# Patient Record
Sex: FEMALE | Race: WHITE
[De-identification: names, ages, dates, MRNs, and addresses within clinical notes are randomized per-mention and may not be internally consistent; named-entity substitution may affect disease eponyms.]

---

## 2019-04-09 ENCOUNTER — HOSPITAL ENCOUNTER (INPATIENT)
Dept: HOSPITAL 25 - ED | Age: 44
LOS: 7 days | Discharge: HOME | DRG: 751 | End: 2019-04-16
Attending: PSYCHIATRY & NEUROLOGY | Admitting: PSYCHIATRY & NEUROLOGY
Payer: MEDICAID

## 2019-04-09 DIAGNOSIS — J45.909: ICD-10-CM
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
## 2019-04-09 DIAGNOSIS — Z81.1: ICD-10-CM

## 2019-04-09 DIAGNOSIS — K76.0: ICD-10-CM

## 2019-04-09 DIAGNOSIS — Z91.048: ICD-10-CM

## 2019-04-09 DIAGNOSIS — I10: ICD-10-CM

## 2019-04-09 DIAGNOSIS — F33.2: Primary | ICD-10-CM

## 2019-04-09 DIAGNOSIS — Z88.8: ICD-10-CM

## 2019-04-09 DIAGNOSIS — F19.94: ICD-10-CM

## 2019-04-09 DIAGNOSIS — F10.229: ICD-10-CM

## 2019-04-09 DIAGNOSIS — Z56.0: ICD-10-CM

## 2019-04-09 DIAGNOSIS — Z91.410: ICD-10-CM

## 2019-04-09 DIAGNOSIS — F43.10: ICD-10-CM

## 2019-04-09 DIAGNOSIS — F41.0: ICD-10-CM

## 2019-04-09 DIAGNOSIS — F17.210: ICD-10-CM

## 2019-04-09 LAB
ALBUMIN SERPL BCG-MCNC: 4.1 G/DL (ref 3.2–5.2)
ALBUMIN/GLOB SERPL: 1.2 {RATIO} (ref 1–3)
ALP SERPL-CCNC: 85 U/L (ref 34–104)
ALT SERPL W P-5'-P-CCNC: 17 U/L (ref 7–52)
ANION GAP SERPL CALC-SCNC: 13 MMOL/L (ref 2–11)
APAP SERPL-MCNC: < 15 MCG/ML
AST SERPL-CCNC: 22 U/L (ref 13–39)
BASOPHILS # BLD AUTO: 0.1 10^3/UL (ref 0–0.2)
BUN SERPL-MCNC: 10 MG/DL (ref 6–24)
BUN/CREAT SERPL: 24.4 (ref 8–20)
CALCIUM SERPL-MCNC: 9.2 MG/DL (ref 8.6–10.3)
CHLORIDE SERPL-SCNC: 105 MMOL/L (ref 101–111)
EOSINOPHIL # BLD AUTO: 0 10^3/UL (ref 0–0.6)
GLOBULIN SER CALC-MCNC: 3.4 G/DL (ref 2–4)
GLUCOSE SERPL-MCNC: 90 MG/DL (ref 70–100)
HCG SERPL QL: 1.25 MIU/ML
HCO3 SERPL-SCNC: 25 MMOL/L (ref 22–32)
HCT VFR BLD AUTO: 38 % (ref 33–41)
HGB BLD-MCNC: 12.6 G/DL (ref 12–16)
LYMPHOCYTES # BLD AUTO: 3.2 10^3/UL (ref 1–4.8)
MCH RBC QN AUTO: 30 PG (ref 27–31)
MCHC RBC AUTO-ENTMCNC: 33 G/DL (ref 31–36)
MCV RBC AUTO: 90 FL (ref 80–97)
MONOCYTES # BLD AUTO: 0.3 10^3/UL (ref 0–0.8)
NEUTROPHILS # BLD AUTO: 1.4 10^3/UL (ref 1.5–7.7)
NRBC # BLD AUTO: 0 10^3/UL
NRBC BLD QL AUTO: 0
PLATELET # BLD AUTO: 264 10^3/UL (ref 150–450)
POTASSIUM SERPL-SCNC: 3.6 MMOL/L (ref 3.5–5)
PROT SERPL-MCNC: 7.5 G/DL (ref 6.4–8.9)
RBC # BLD AUTO: 4.25 10^6 /UL (ref 3.7–4.87)
SALICYLATES SERPL-MCNC: < 2.5 MG/DL (ref ?–30)
SODIUM SERPL-SCNC: 143 MMOL/L (ref 135–145)
TSH SERPL-ACNC: 1.13 MCIU/ML (ref 0.34–5.6)
WBC # BLD AUTO: 5 10^3/UL (ref 3.5–10.8)

## 2019-04-09 PROCEDURE — 81003 URINALYSIS AUTO W/O SCOPE: CPT

## 2019-04-09 PROCEDURE — 80329 ANALGESICS NON-OPIOID 1 OR 2: CPT

## 2019-04-09 PROCEDURE — 99222 1ST HOSP IP/OBS MODERATE 55: CPT

## 2019-04-09 PROCEDURE — 99238 HOSP IP/OBS DSCHRG MGMT 30/<: CPT

## 2019-04-09 PROCEDURE — 36415 COLL VENOUS BLD VENIPUNCTURE: CPT

## 2019-04-09 PROCEDURE — 99231 SBSQ HOSP IP/OBS SF/LOW 25: CPT

## 2019-04-09 PROCEDURE — 85025 COMPLETE CBC W/AUTO DIFF WBC: CPT

## 2019-04-09 PROCEDURE — 99232 SBSQ HOSP IP/OBS MODERATE 35: CPT

## 2019-04-09 PROCEDURE — 84443 ASSAY THYROID STIM HORMONE: CPT

## 2019-04-09 PROCEDURE — 80053 COMPREHEN METABOLIC PANEL: CPT

## 2019-04-09 PROCEDURE — 84702 CHORIONIC GONADOTROPIN TEST: CPT

## 2019-04-09 PROCEDURE — 80061 LIPID PANEL: CPT

## 2019-04-09 PROCEDURE — 99284 EMERGENCY DEPT VISIT MOD MDM: CPT

## 2019-04-09 PROCEDURE — G0480 DRUG TEST DEF 1-7 CLASSES: HCPCS

## 2019-04-09 PROCEDURE — 80307 DRUG TEST PRSMV CHEM ANLYZR: CPT

## 2019-04-09 PROCEDURE — 83036 HEMOGLOBIN GLYCOSYLATED A1C: CPT

## 2019-04-09 PROCEDURE — 80320 DRUG SCREEN QUANTALCOHOLS: CPT

## 2019-04-09 SDOH — ECONOMIC STABILITY - INCOME SECURITY: UNEMPLOYMENT, UNSPECIFIED: Z56.0

## 2019-04-09 NOTE — ED
Psychiatric Complaint





- HPI Summary


HPI Summary: 





A 44 y/o F brought in by ambulance presents to ED for MHE. Patient goes to 

[facility]. She is not medication compliant with her Cymbalta

, Seroquel, Hydroxyzine nor Klonopin. At bedside, she says I need to go to 

mental health. Shes been there before "a long time ago." She says "I'm scared.

" She admits to drinking today. Pt becomes easily tearful at bedside. Denies 

previous SI attempt. 











- History Of Current Complaint


Time Seen by Provider: 19 19:39


Hx Obtained From: Patient


Onset/Duration: Still Present


Timing: Constant


Severity Initially: Moderate


Severity Currently: Moderate


Character: Fearful


Aggravating Factor(s): Medication Non-compliance


Related History: Positive For: Prior Psychiatric Issues


Has Suicidal: Denies: Has Prior Attempt(s)





- Allergies/Home Medications


Allergies/Adverse Reactions: 


 Allergies











Allergy/AdvReac Type Severity Reaction Status Date / Time


 


Adhesive Tape [Paper Tape] Allergy  Itching Verified 19 10:43


 


pregabalin [From Lyrica] Allergy  Unknown Verified 19 10:43





   Reaction  





   Details  














PMH/Surg Hx/FS Hx/Imm Hx


Previously Healthy: No


Endocrine/Hematology History: 


   Denies: Hx Diabetes


Cardiovascular History: Reports: Hx Hypertension


   Denies: Hx Pacemaker/ICD


Respiratory History: Reports: Hx Asthma


GI History: Reports: Other GI Disorders - hx of fatty liver disease


Musculoskeletal History: Reports: Hx Back Problems, Other Musculoskeletal 

History - hx of degenerative disc disease


Sensory History: Reports: Hx Contacts or Glasses - pt states "supposed to wear 

glasses but don't use them"


   Denies: Hx Hearing Aid


Opthamlomology History: Reports: Hx Contacts or Glasses - pt states "supposed 

to wear glasses but don't use them"


Neurological History: Reports: Hx Seizures - d/t etoh withdrawal 7 years ago


Psychiatric History: Reports: Hx Anxiety, Hx Depression, Hx Panic Disorder - 

ANXIETY, Hx Inpatient Treatment - following partner's suicide in , 

Hx Substance Abuse


   Denies: Hx Eating Disorder, Hx of Violent Episodes Against Others





- Surgical History


Surgery Procedure, Year, and Place:  & [facility] SURGERY X2.  





- Immunization History


Date of Tetanus Vaccine: utd


Date of Influenza Vaccine: none


Infectious Disease History: 


   Denies: Hx of Known/Suspected MRSA





- Family History


Known Family History: Positive: Other


   Negative: Diabetes


Family History: alcoholism, SI





- Social History


Occupation: Unemployed


Lives: With Family


Alcohol Use: Daily


Alcohol Amount: 1/5 a week


Hx Substance Use: No


Substance Use Type: Reports: Marijuana


Substance Use Comment - Amount & Last Used: occasionally


Hx Tobacco Use: Yes


Smoking Status (MU): Heavy Every Day Tobacco Smoker


Type: Cigarettes


Amount Used/How Often: states she smokes 1 PPD


Have You Smoked in the Last Year: Yes





Review of Systems


Positive: Other - pos: intoxicated.  Negative: Fever


Psychological: Other - pos: scared


All Other Systems Reviewed And Are Negative: Yes





Physical Exam





- Summary


Physical Exam Summary: 





Appearance: The patient is well-nourished in no acute distress and in no acute 

pain. Patient's breath smells of ETOH, patient slurs her speech. 





Skin: The skin is warm and dry and skin color reflects adequate perfusion.





HEENT:  The head is normocephalic and atraumatic. The pupils are equal and 

reactive. The conjunctivae are clear and without drainage.  Nares are patent 

and without drainage.  Mouth reveals moist mucous membranes and the throat is 

without erythema and exudate.  The external ears are intact. The ear canals are 

patent and without drainage. The tympanic membranes are intact.





Neck: the neck is supple with full range of motion and non-tender. There are no 

carotid bruits.  There is no neck vein distension.





Respiratory: Chest is non-tender.  Lungs are clear to auscultation and breath 

sounds are symmetrical and equal.





Cardiovascular: Heart is regular rate and rhythm.  There is no murmur or rub 

auscultated.   There is no peripheral edema and pulses are symmetrical and 

equal.





Abdomen: The abdomen is soft and non-tender.  There are normal bowel sounds 

heard in all four quadrants and there is no organomegaly palpated.





Musculoskeletal: There is no back tenderness noted.  Extremities are non-tender 

with full range of motion.  There is good capillary refill.  There is no 

peripheral edema or calf tenderness elicited.





Neurological: Patient is alert and oriented to person, place and time.  The 

patient has symmetrical motor strength in all four extremities.  Cranial nerves 

are grossly intact. Deep tendon reflexes are symmetrical and equal in all four 

extremities.





Psychiatric: The patient is emotionally labile. 








Triage Information Reviewed: Yes


Vital Signs Reviewed: Yes





Diagnostics





- Laboratory


Result Diagrams: 


 19 20:21





 19 20:21


Lab Statement: Any lab studies that have been ordered have been reviewed, and 

results considered in the medical decision making process.





Course/Dx





- Course


Course Of Treatment: Ms. Herbert presented tearful and asking to be admitted to 

the mental health unit.  She was found to be quite intoxicated and at this 

point is sleeping and stable.  She will be reevaluated in the morning.





- Differential Dx/Clinical Impression


Provider Diagnosis: 


 Alcohol intoxication








Discharge





- Sign-Out/Discharge


Documenting (check all that apply): Sign-Out Patient


Signing out patient TO: Timothy Durham - pending sobriety/MHE


Patient Received Moderate/Deep Sedation with Procedure: No





- Discharge Plan


Referrals: 


No Primary Care Phys,NOPCP [Primary Care Provider] - 





- Attestation Statements


Document Initiated by Meñoibjaclyn: Yes


Documenting Scribe: Breana Lugo


Provider For Whom Scribe is Documenting (Include Credential): Dr. Lee Haile MD


Scribe Attestation: 


NICO, lisa Mahered for Dr. Lee Haile MD on 19 at 2151. 


Scribe Documentation Reviewed: Yes


Provider Attestation: 


The documentation as recorded by the Breana jolly accurately 

reflects the service I personally performed and the decisions made by me, Dr. Lee Haile MD


Status of Scribe Document: Viewed

## 2019-04-10 RX ADMIN — NICOTINE PRN MG: 4 INHALANT RESPIRATORY (INHALATION) at 10:59

## 2019-04-10 RX ADMIN — NICOTINE PRN MG: 4 INHALANT RESPIRATORY (INHALATION) at 19:53

## 2019-04-10 RX ADMIN — QUETIAPINE FUMARATE SCH MG: 300 TABLET, FILM COATED, EXTENDED RELEASE ORAL at 21:01

## 2019-04-10 RX ADMIN — DULOXETINE HYDROCHLORIDE SCH MG: 60 CAPSULE, DELAYED RELEASE ORAL at 21:00

## 2019-04-10 RX ADMIN — GABAPENTIN SCH MG: 400 CAPSULE ORAL at 21:00

## 2019-04-10 RX ADMIN — GABAPENTIN SCH MG: 400 CAPSULE ORAL at 14:20

## 2019-04-10 RX ADMIN — NICOTINE POLACRILEX PRN MG: 2 GUM, CHEWING BUCCAL at 10:59

## 2019-04-10 RX ADMIN — LIDOCAINE PRN PATCH: 50 PATCH CUTANEOUS at 18:00

## 2019-04-10 RX ADMIN — LORAZEPAM SCH MG: 1 TABLET ORAL at 14:20

## 2019-04-10 RX ADMIN — LORAZEPAM SCH MG: 1 TABLET ORAL at 10:47

## 2019-04-10 RX ADMIN — TRAZODONE HYDROCHLORIDE SCH MG: 100 TABLET ORAL at 21:01

## 2019-04-10 RX ADMIN — NICOTINE SCH PATCH: 21 PATCH TRANSDERMAL at 14:21

## 2019-04-10 RX ADMIN — DULOXETINE HYDROCHLORIDE SCH MG: 60 CAPSULE, DELAYED RELEASE ORAL at 14:20

## 2019-04-10 RX ADMIN — ACETAMINOPHEN PRN MG: 325 TABLET ORAL at 19:51

## 2019-04-10 RX ADMIN — NICOTINE POLACRILEX PRN MG: 2 GUM, CHEWING BUCCAL at 19:54

## 2019-04-10 RX ADMIN — WATER SCH: 100 INJECTION, SOLUTION INTRAVENOUS at 21:35

## 2019-04-10 RX ADMIN — LORAZEPAM SCH MG: 1 TABLET ORAL at 18:19

## 2019-04-10 NOTE — ED
Progress





- Progress Note


Progress Note: 


This patient was signed out from Dr. Durham to Dr. Haro upon shift change, 

pending sobriety and MHE. 





Re-Evaluation





- Re-Evaluation


  ** 1st re-eval


Re-Evaluation Time: 09:07


Change: Unchanged


Comment: Per Dr. Simeon, the pt will be [facility] with dx of alcohol 

intoxication and substance induced mood disorder.





Course/Dx





- Course


Course Of Treatment: This patient was signed out from Dr. Durham to Dr. Haro 

upon shift change, pending sobriety and MHE. As of 0907, per Dr. Simeon, the pt 

will be admitted to [facility] with dx of alcohol intoxication and substance induced 

mood disorder. The pt is agreeable with this plan.





- Diagnoses


Provider Diagnoses: 


 Alcohol intoxication, Substance induced mood disorder








Discharge





- Sign-Out/Discharge


Documenting (check all that apply): Patient Departure, Receiving Sign-Out


Receiving patient FROM: Timothy Durham


Patient Received Moderate/Deep Sedation with Procedure: No





- Discharge Plan


Condition: Stable


Disposition: PSYCHIATRIC FACILITY[facility]





- Billing Disposition and Condition


Condition: STABLE


Disposition: Psychiatric Facility [facility]





- Attestation Statements


Document Initiated by Scribe: Yes


Documenting Scribe: Faith Brown


Provider For Whom Meñoibe is Documenting (Include Credential): Lee Haro MD.


Scribe Attestation: 


Faith WALSH scribed for Lee Haro MD. on 04/10/19 at 1744. 


Scribe Documentation Reviewed: Yes


Provider Attestation: 


The documentation as recorded by the scribe, Faith Brown accurately 

reflects the service I personally performed and the decisions made by me, 

Lee Haro MD.


Status of Scribe Document: Viewed

## 2019-04-10 NOTE — ED
Progress





- Progress Note


Progress Note: 


This patient was signed out from Dr. Haile to Dr. Durham upon shift change, 

pending sobriety and MHE. The patient is cleared for MHE at 0630. This patient 

will be signed out to Dr. Haro upon shift change, pending MHE.





Course/Dx





- Course


Course Of Treatment: This patient was signed out from Dr. Haile to Dr. Durham 

upon shift change, pending sobriety and MHE. The patient is cleared for MHE at 

0630. This patient will be signed out to Dr. Haro upon shift change, pending 

MHE.





- Diagnoses


Provider Diagnoses: 


 Alcohol intoxication








Discharge





- Sign-Out/Discharge


Documenting (check all that apply): Sign-Out Patient - pending MHE


Signing out patient TO: Lee Tahir


Patient Received Moderate/Deep Sedation with Procedure: No





- Discharge Plan


Condition: Stable


Referrals: 


No Primary Care Phys,NOPCP [Primary Care Provider] - 





- Attestation Statements


Document Initiated by Scribe: Yes


Documenting Scribe: Tirston Melendrez


Provider For Whom Scribe is Documenting (Include Credential): Timothy Durham MD


Scribe Attestation: 


ITriston, scribed for Timothy Durham MD on 04/10/19 at 0539. 


Status of Scribe Document: Ready
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
## 2019-04-11 LAB
CHOLEST SERPL-MCNC: 190 MG/DL
HDLC SERPL-MCNC: 87.7 MG/DL
TRIGL SERPL-MCNC: 99 MG/DL

## 2019-04-11 RX ADMIN — FOLIC ACID SCH MG: 1 TABLET ORAL at 10:03

## 2019-04-11 RX ADMIN — DULOXETINE HYDROCHLORIDE SCH MG: 60 CAPSULE, DELAYED RELEASE ORAL at 20:36

## 2019-04-11 RX ADMIN — NICOTINE SCH PATCH: 21 PATCH TRANSDERMAL at 10:06

## 2019-04-11 RX ADMIN — THERA TABS SCH TAB: TAB at 10:03

## 2019-04-11 RX ADMIN — ACETAMINOPHEN PRN MG: 325 TABLET ORAL at 20:38

## 2019-04-11 RX ADMIN — LORAZEPAM SCH MG: 1 TABLET ORAL at 23:19

## 2019-04-11 RX ADMIN — DULOXETINE HYDROCHLORIDE SCH MG: 60 CAPSULE, DELAYED RELEASE ORAL at 10:03

## 2019-04-11 RX ADMIN — Medication SCH MG: at 10:03

## 2019-04-11 RX ADMIN — HYDROXYZINE HYDROCHLORIDE PRN MG: 50 TABLET, FILM COATED ORAL at 16:01

## 2019-04-11 RX ADMIN — NALTREXONE HYDROCHLORIDE SCH MG: 50 TABLET, FILM COATED ORAL at 14:03

## 2019-04-11 RX ADMIN — QUETIAPINE FUMARATE SCH MG: 300 TABLET, FILM COATED, EXTENDED RELEASE ORAL at 20:35

## 2019-04-11 RX ADMIN — GABAPENTIN SCH MG: 400 CAPSULE ORAL at 20:36

## 2019-04-11 RX ADMIN — TRAZODONE HYDROCHLORIDE SCH MG: 100 TABLET ORAL at 20:36

## 2019-04-11 RX ADMIN — LORAZEPAM SCH MG: 1 TABLET ORAL at 12:52

## 2019-04-11 RX ADMIN — GABAPENTIN SCH MG: 400 CAPSULE ORAL at 14:03

## 2019-04-11 RX ADMIN — ACETAMINOPHEN PRN MG: 325 TABLET ORAL at 12:03

## 2019-04-11 RX ADMIN — GABAPENTIN SCH MG: 400 CAPSULE ORAL at 10:03

## 2019-04-11 RX ADMIN — WATER SCH NOTE: 100 INJECTION, SOLUTION INTRAVENOUS at 20:37

## 2019-04-11 NOTE — PN
Subjective





- Subjective


Date of Service: 04/11/19


Service Type: 98718 Hosp care 25 min moderate complexity


Subjective: 


Patient lying in bed, pleasant upon approach.  She presents as dysphoric with 

restricted affect.  We discuss use of naltrexone for alcohol use d/o.  She is 

informed of danger of using alcohol with this medication and states 

understanding. 


She has been attending 1-2 groups per shift.  Patient reports desire to 

continue stabilization on BSU.  She is actively scoring on WAM.  She denies 

side effects from medication changes.   





Objective





- General Observations


Appearance: Disheveled


Appears Stated Age: No - slighlty older


Stature: WNL


Posture: Slumped


Eye Contact: Average


Behavior/Activity: Slowed





- Interaction Observations


Attitude Towards Examiner: Cooperative


Stated Mood: Dysphoric


Affect: Restricted


Speech Pattern/Tone: Clear, Appropriate, Normal Volume


Thought Process: Impoverished


Perception: WNL


Thought Content: Depressive


Thought Process: Lethality: Passive Death Wish


Hallucination Type: None


Delusion Type: None





- Cognitive Function


Orientation: A&O x 4


Level of Consciousness: Alert


Cognition: WNL


Estimated Intelligence: Normal


Insight: WNL


Judgment Within Normal Limits: No


Ability to Make Reasonable Decisions: Moderately Impaired





- Medication Compliance


Cooperative with Inpatient Medication Regimen: Yes





- Group Participation


Participates in Group Activities: Partial





Assessment





- Assessment


Merits Inpatient Hospitalization: For Immediate Safety, For Stabilization, For 

Ongoing Evaluation


Inpatient DSM-V Dx: F33.2


Clinical Impression: 


44yo wf, domiciled, disabled with history of MDD, PTSD and alcohol dependence. 

This is the fourth lifetime inpatient psychiatric admission since the suicides 

of her partner of 17years and her son within months of each other.  She has a 

history of sobriety prior to these events and since then has had difficulty 

with alcohol dependence.  She reports nonadherence to outpatient appointments 

and medications; minimizes alcohol use.  She continues to score on WAM and 

merits hospitalization for immediate safety and stabilization. 





Plan





- Plan


Treatment Plan: 


Name: REA SHERWOOD                        


YOB: 1975                        


[id number]


[id number]








continue acute intensive psychiatric treatment.  may decrease to q30min 

observation.


add naltrexone 50mg po daily for alcohol use d/o.  continue WAM.


may use petroleum jelly topical for psoriasis. 


Continued Medication Management: Start Medication


Medications: 


 Current Medications





Acetaminophen (Tylenol Tab*)  650 mg PO Q4H PRN


   PRN Reason: for pain; or Temp >101 F


   Last Admin: 04/11/19 12:03 Dose:  650 mg


Al Hydrox/Mg Hydrox/Simethicone (Maalox Plus*)  30 ml PO Q4H PRN


   PRN Reason: INDIGESTION


Albuterol (Ventolin Hfa Inhaler*)  2 puff INH Q4H PRN


   PRN Reason: WHEEZING


Device (Nicotine Mouth Piece*)  1 each INH .USE W/ CARTRIDGE PRN


   PRN Reason: WITHDRAWAL - NICOTINE


   Last Admin: 04/10/19 10:59 Dose:  1 each


Duloxetine HCl (Cymbalta Cap*)  60 mg PO BID [facility]


   Last Admin: 04/11/19 10:03 Dose:  60 mg


Folic Acid (Folvite Tab*)  1 mg PO DAILY [facility]


   Last Admin: 04/11/19 10:03 Dose:  1 mg


Gabapentin (Neurontin Cap(*))  800 mg PO TID [facility]


   Last Admin: 04/11/19 10:03 Dose:  800 mg


Hydroxyzine HCl (Atarax Tab*)  50 mg PO Q4H PRN


   PRN Reason: ANXIETY


Lidocaine (Lidoderm 5% Patch*)  1 patch TRANSDERM DAILY PRN


   PRN Reason: PAIN


   Last Admin: 04/10/19 18:00 Dose:  1 patch


Lorazepam (Ativan Tab(*))  0 - 6 mg PO .PER WA PROTOCOL [facility]; Protocol


   Last Admin: 04/11/19 12:52 Dose:  2 mg


Multivitamins/Minerals (Theragran/Minerals Tab*)  1 tab PO DAILY [facility]


   Last Admin: 04/11/19 10:03 Dose:  1 tab


Naltrexone HCl (Naltrexone Tab*)  50 mg PO DAILY [facility]; Protocol


Nicotine (Nicotine Inhaler*)  10 mg INH Q2H PRN


   PRN Reason: CRAVING


   Last Admin: 04/10/19 19:53 Dose:  10 mg


Nicotine (Nicotine Patch 21 Mg/24 Hr*)  1 patch TRANSDERM DAILY [facility]


   Last Admin: 04/11/19 10:06 Dose:  1 patch


Nicotine Polacrilex (Nicotine Gum*)  2 mg PO Q2H PRN


   PRN Reason: CRAVING


   Last Admin: 04/10/19 19:54 Dose:  2 mg


Pharmacy Profile Note (Nicotine Patch Removal Note*)  1 note FOLLOW UP 2100 [facility]


   Last Admin: 04/10/19 21:35 Dose:  Not Given


Quetiapine Fumarate (Seroquel Xr Tab*)  300 mg PO BEDTIME [facility]


   Last Admin: 04/10/19 21:01 Dose:  300 mg


Thiamine HCl (Vitamin B-1 Tab*)  100 mg PO DAILY [facility]


   Last Admin: 04/11/19 10:03 Dose:  100 mg


Trazodone HCl (Desyrel Tab*)  100 mg PO BEDTIME [facility]


   Last Admin: 04/10/19 21:01 Dose:  100 mg











- Discharge Plan


Discharge Plan: Inpatient Hospitalization

## 2019-04-12 RX ADMIN — NALTREXONE HYDROCHLORIDE SCH MG: 50 TABLET, FILM COATED ORAL at 08:45

## 2019-04-12 RX ADMIN — WATER SCH NOTE: 100 INJECTION, SOLUTION INTRAVENOUS at 20:28

## 2019-04-12 RX ADMIN — DULOXETINE HYDROCHLORIDE SCH MG: 60 CAPSULE, DELAYED RELEASE ORAL at 08:44

## 2019-04-12 RX ADMIN — THERA TABS SCH TAB: TAB at 08:44

## 2019-04-12 RX ADMIN — CYCLOBENZAPRINE HYDROCHLORIDE PRN MG: 10 TABLET, FILM COATED ORAL at 20:15

## 2019-04-12 RX ADMIN — NICOTINE POLACRILEX PRN MG: 2 GUM, CHEWING BUCCAL at 18:32

## 2019-04-12 RX ADMIN — CYCLOBENZAPRINE HYDROCHLORIDE PRN MG: 10 TABLET, FILM COATED ORAL at 14:49

## 2019-04-12 RX ADMIN — ACETAMINOPHEN PRN MG: 325 TABLET ORAL at 12:30

## 2019-04-12 RX ADMIN — GABAPENTIN SCH MG: 400 CAPSULE ORAL at 08:44

## 2019-04-12 RX ADMIN — GABAPENTIN SCH MG: 400 CAPSULE ORAL at 14:06

## 2019-04-12 RX ADMIN — GABAPENTIN SCH MG: 400 CAPSULE ORAL at 20:14

## 2019-04-12 RX ADMIN — TRAZODONE HYDROCHLORIDE SCH MG: 100 TABLET ORAL at 20:15

## 2019-04-12 RX ADMIN — DULOXETINE HYDROCHLORIDE SCH MG: 60 CAPSULE, DELAYED RELEASE ORAL at 20:14

## 2019-04-12 RX ADMIN — Medication SCH MG: at 08:44

## 2019-04-12 RX ADMIN — NICOTINE PRN MG: 4 INHALANT RESPIRATORY (INHALATION) at 18:32

## 2019-04-12 RX ADMIN — NICOTINE SCH PATCH: 21 PATCH TRANSDERMAL at 08:45

## 2019-04-12 RX ADMIN — LIDOCAINE PRN PATCH: 50 PATCH CUTANEOUS at 08:46

## 2019-04-12 RX ADMIN — HYDROXYZINE HYDROCHLORIDE PRN MG: 50 TABLET, FILM COATED ORAL at 12:30

## 2019-04-12 RX ADMIN — FOLIC ACID SCH MG: 1 TABLET ORAL at 08:44

## 2019-04-12 RX ADMIN — QUETIAPINE FUMARATE SCH MG: 300 TABLET, FILM COATED, EXTENDED RELEASE ORAL at 20:15

## 2019-04-12 RX ADMIN — LORAZEPAM SCH MG: 1 TABLET ORAL at 14:06

## 2019-04-12 NOTE — PN
Subjective





- Subjective


Date of Service: 04/12/19


Service Type: 44917 Hosp care 15 min low complexity


Subjective: 


Patient presents with brighter affect and is more talkative.  She continues to 

score on WAM.  Patient reports severe generalized pain due to arthritis and 

reports severe neck and head pain due to cervical pathology.  She declines 

offer of PT consult, stating she has been in PT in the past and simply needs to 

do the exercises she has been taught.  She reports improved mood and exhibits 

improved insight into alcohol abuse.  





Objective





- General Observations


Appearance: Disheveled


Appears Stated Age: Yes


Stature: WNL


Posture: Slumped


Eye Contact: Average


Behavior/Activity: WNL





- Interaction Observations


Attitude Towards Examiner: Cooperative


Stated Mood: Euthymic


Affect: Full


Speech Pattern/Tone: Clear, Appropriate, Normal Volume


Thought Process: Coherent, Goal Directed


Perception: WNL


Thought Content: WNL


Hallucination Type: None


Delusion Type: None





- Cognitive Function


Orientation: A&O x 4


Level of Consciousness: Alert


Cognition: WNL


Estimated Intelligence: Normal


Insight: WNL


Judgment Within Normal Limits: Yes


Ability to Make Reasonable Decisions: Mildly Impaired





- Medication Compliance


Cooperative with Inpatient Medication Regimen: Yes





- Group Participation


Participates in Group Activities: Partial





Assessment





- Assessment


Inpatient DSM-V Dx: F33.2


Clinical Impression: 


42yo wf, domiciled, disabled with history of MDD, PTSD and alcohol dependence. 

This is the fourth lifetime inpatient psychiatric admission since the suicides 

of her partner of 17years and her son within months of each other.  She has a 

history of sobriety prior to these events and since then has had difficulty 

with alcohol dependence.  She reports nonadherence to outpatient appointments 

and medications; minimizes alcohol use.  She continues to score on WAM and 

merits hospitalization for immediate safety and stabilization. 





Plan





- Plan


Treatment Plan: 


Name: REA SHERWOOD                        


YOB: 1975                        


[id number]


[id number]








continue acute intensive psychiatric treatment.  may decrease to q30min 

observation.  may allow staff pass.


add flexeril 10mg TID prn for neck strain/pain.  continue WAM. may use 

petroleum jelly topical for psoriasis. 


discharge planning to include outpatient referrals for substance use tx and 

case management. 


Continued Medication Management: Start Medication


Medications: 


 Current Medications





Acetaminophen (Tylenol Tab*)  650 mg PO Q4H PRN


   PRN Reason: for pain; or Temp >101 F


   Last Admin: 04/12/19 12:30 Dose:  650 mg


Al Hydrox/Mg Hydrox/Simethicone (Maalox Plus*)  30 ml PO Q4H PRN


   PRN Reason: INDIGESTION


Albuterol (Ventolin Hfa Inhaler*)  2 puff INH Q4H PRN


   PRN Reason: WHEEZING


Cyclobenzaprine HCl (Flexeril Tab*)  10 mg PO TID PRN


   PRN Reason: PAIN


Device (Nicotine Mouth Piece*)  1 each INH .USE W/ CARTRIDGE PRN


   PRN Reason: WITHDRAWAL - NICOTINE


   Last Admin: 04/10/19 10:59 Dose:  1 each


Duloxetine HCl (Cymbalta Cap*)  60 mg PO BID [facility]


   Last Admin: 04/12/19 08:44 Dose:  60 mg


Folic Acid (Folvite Tab*)  1 mg PO DAILY [facility]


   Last Admin: 04/12/19 08:44 Dose:  1 mg


Gabapentin (Neurontin Cap(*))  800 mg PO TID [facility]


   Last Admin: 04/12/19 14:06 Dose:  800 mg


Hydroxyzine HCl (Atarax Tab*)  50 mg PO Q4H PRN


   PRN Reason: ANXIETY


   Last Admin: 04/12/19 12:30 Dose:  50 mg


Lidocaine (Lidoderm 5% Patch*)  1 patch TRANSDERM DAILY PRN


   PRN Reason: PAIN


   Last Admin: 04/12/19 08:46 Dose:  1 patch


Lorazepam (Ativan Tab(*))  0 - 6 mg PO .PER [facility] PROTOCOL [facility]; Protocol


   Last Admin: 04/12/19 14:06 Dose:  2 mg


Multivitamins/Minerals (Theragran/Minerals Tab*)  1 tab PO DAILY [facility]


   Last Admin: 04/12/19 08:44 Dose:  1 tab


Naltrexone HCl (Naltrexone Tab*)  50 mg PO DAILY [facility]; Protocol


   Last Admin: 04/12/19 08:45 Dose:  50 mg


Nicotine (Nicotine Inhaler*)  10 mg INH Q2H PRN


   PRN Reason: CRAVING


   Last Admin: 04/10/19 19:53 Dose:  10 mg


Nicotine (Nicotine Patch 21 Mg/24 Hr*)  1 patch TRANSDERM DAILY [facility]


   Last Admin: 04/12/19 08:45 Dose:  1 patch


Nicotine Polacrilex (Nicotine Gum*)  2 mg PO Q2H PRN


   PRN Reason: CRAVING


   Last Admin: 04/10/19 19:54 Dose:  2 mg


Pharmacy Profile Note (Nicotine Patch Removal Note*)  1 note FOLLOW UP 2100 [facility]


   Last Admin: 04/11/19 20:37 Dose:  1 note


Quetiapine Fumarate (Seroquel Xr Tab*)  300 mg PO BEDTIME [facility]


   Last Admin: 04/11/19 20:35 Dose:  300 mg


Thiamine HCl (Vitamin B-1 Tab*)  100 mg PO DAILY [facility]


   Last Admin: 04/12/19 08:44 Dose:  100 mg


Trazodone HCl (Desyrel Tab*)  100 mg PO BEDTIME [facility]


   Last Admin: 04/11/19 20:36 Dose:  100 mg











- Discharge Plan


Discharge Plan: Inpatient Hospitalization


Outpatient Program: [facility]

## 2019-04-13 RX ADMIN — NICOTINE SCH PATCH: 21 PATCH TRANSDERMAL at 08:04

## 2019-04-13 RX ADMIN — CYCLOBENZAPRINE HYDROCHLORIDE PRN MG: 10 TABLET, FILM COATED ORAL at 13:00

## 2019-04-13 RX ADMIN — ACETAMINOPHEN PRN MG: 325 TABLET ORAL at 10:56

## 2019-04-13 RX ADMIN — LIDOCAINE PRN PATCH: 50 PATCH CUTANEOUS at 20:28

## 2019-04-13 RX ADMIN — GABAPENTIN SCH MG: 400 CAPSULE ORAL at 12:59

## 2019-04-13 RX ADMIN — CYCLOBENZAPRINE HYDROCHLORIDE PRN MG: 10 TABLET, FILM COATED ORAL at 08:04

## 2019-04-13 RX ADMIN — QUETIAPINE FUMARATE SCH MG: 300 TABLET, FILM COATED, EXTENDED RELEASE ORAL at 20:27

## 2019-04-13 RX ADMIN — HYDROXYZINE HYDROCHLORIDE PRN MG: 50 TABLET, FILM COATED ORAL at 17:29

## 2019-04-13 RX ADMIN — LORAZEPAM SCH MG: 1 TABLET ORAL at 03:12

## 2019-04-13 RX ADMIN — TRAZODONE HYDROCHLORIDE SCH MG: 100 TABLET ORAL at 20:27

## 2019-04-13 RX ADMIN — FOLIC ACID SCH MG: 1 TABLET ORAL at 08:04

## 2019-04-13 RX ADMIN — ACETAMINOPHEN PRN MG: 325 TABLET ORAL at 16:59

## 2019-04-13 RX ADMIN — DULOXETINE HYDROCHLORIDE SCH MG: 60 CAPSULE, DELAYED RELEASE ORAL at 20:26

## 2019-04-13 RX ADMIN — HYDROXYZINE HYDROCHLORIDE PRN MG: 50 TABLET, FILM COATED ORAL at 10:56

## 2019-04-13 RX ADMIN — WATER SCH NOTE: 100 INJECTION, SOLUTION INTRAVENOUS at 20:35

## 2019-04-13 RX ADMIN — DULOXETINE HYDROCHLORIDE SCH MG: 60 CAPSULE, DELAYED RELEASE ORAL at 08:04

## 2019-04-13 RX ADMIN — HYDROXYZINE HYDROCHLORIDE PRN MG: 50 TABLET, FILM COATED ORAL at 22:23

## 2019-04-13 RX ADMIN — GABAPENTIN SCH MG: 400 CAPSULE ORAL at 08:03

## 2019-04-13 RX ADMIN — THERA TABS SCH TAB: TAB at 08:04

## 2019-04-13 RX ADMIN — Medication SCH MG: at 08:04

## 2019-04-13 RX ADMIN — GABAPENTIN SCH MG: 400 CAPSULE ORAL at 20:25

## 2019-04-13 RX ADMIN — NALTREXONE HYDROCHLORIDE SCH MG: 50 TABLET, FILM COATED ORAL at 08:04

## 2019-04-14 RX ADMIN — HYDROXYZINE HYDROCHLORIDE PRN MG: 50 TABLET, FILM COATED ORAL at 12:48

## 2019-04-14 RX ADMIN — DULOXETINE HYDROCHLORIDE SCH MG: 60 CAPSULE, DELAYED RELEASE ORAL at 20:49

## 2019-04-14 RX ADMIN — GABAPENTIN SCH MG: 400 CAPSULE ORAL at 20:49

## 2019-04-14 RX ADMIN — QUETIAPINE FUMARATE SCH MG: 300 TABLET, FILM COATED, EXTENDED RELEASE ORAL at 20:49

## 2019-04-14 RX ADMIN — NICOTINE POLACRILEX PRN MG: 2 GUM, CHEWING BUCCAL at 14:04

## 2019-04-14 RX ADMIN — TRAZODONE HYDROCHLORIDE SCH MG: 100 TABLET ORAL at 20:49

## 2019-04-14 RX ADMIN — THERA TABS SCH TAB: TAB at 08:00

## 2019-04-14 RX ADMIN — CYCLOBENZAPRINE HYDROCHLORIDE PRN MG: 10 TABLET, FILM COATED ORAL at 20:50

## 2019-04-14 RX ADMIN — CYCLOBENZAPRINE HYDROCHLORIDE PRN MG: 10 TABLET, FILM COATED ORAL at 07:59

## 2019-04-14 RX ADMIN — ACETAMINOPHEN PRN MG: 325 TABLET ORAL at 02:30

## 2019-04-14 RX ADMIN — GABAPENTIN SCH MG: 400 CAPSULE ORAL at 08:00

## 2019-04-14 RX ADMIN — WATER SCH NOTE: 100 INJECTION, SOLUTION INTRAVENOUS at 20:55

## 2019-04-14 RX ADMIN — Medication SCH MG: at 08:00

## 2019-04-14 RX ADMIN — ACETAMINOPHEN PRN MG: 325 TABLET ORAL at 13:41

## 2019-04-14 RX ADMIN — NALTREXONE HYDROCHLORIDE SCH MG: 50 TABLET, FILM COATED ORAL at 08:00

## 2019-04-14 RX ADMIN — GABAPENTIN SCH MG: 400 CAPSULE ORAL at 13:40

## 2019-04-14 RX ADMIN — LORAZEPAM SCH MG: 1 TABLET ORAL at 02:30

## 2019-04-14 RX ADMIN — DULOXETINE HYDROCHLORIDE SCH MG: 60 CAPSULE, DELAYED RELEASE ORAL at 08:00

## 2019-04-14 RX ADMIN — FOLIC ACID SCH MG: 1 TABLET ORAL at 08:00

## 2019-04-14 RX ADMIN — LORAZEPAM SCH MG: 1 TABLET ORAL at 12:56

## 2019-04-14 RX ADMIN — NICOTINE SCH PATCH: 21 PATCH TRANSDERMAL at 08:01

## 2019-04-14 RX ADMIN — LORAZEPAM SCH MG: 1 TABLET ORAL at 17:04

## 2019-04-14 NOTE — PN
Subjective





- Subjective


Date of Service: 04/14/19


Service Type: 64254 Hosp care 15 min low complexity


Subjective: 





Rea appears to be improving well and didn't have any complaints. Mood 

remains sad and her affect is visibly restricted. Otherwise keeping her hopes 

and spirits up she says. Coping with loses well.





Objective





- General Observations


Appearance: Well Groomed


Appears Stated Age: Yes


Stature: WNL


Posture: WNL


Eye Contact: Average


Behavior/Activity: WNL





- Interaction Observations


Attitude Towards Examiner: Cooperative


Stated Mood: Dysphoric


Affect: Restricted


Speech Pattern/Tone: Clear, Appropriate, Normal Volume


Perception: WNL


Thought Content: WNL


Delusion Type: None





- Cognitive Function


Orientation: A&O x 4


Level of Consciousness: Alert


Cognition: WNL


Estimated Intelligence: Normal


Judgment Within Normal Limits: Yes


Ability to Make Reasonable Decisions: Mildly Impaired





- Medication Compliance


Cooperative with Inpatient Medication Regimen: Yes





Assessment





- Assessment


Merits Inpatient Hospitalization: For Stabilization, Pending Safe DC Plan


Inpatient DSM-V Dx: F33.2


Clinical Impression: 


42yo wf, domiciled, disabled with history of MDD, PTSD and alcohol dependence. 

This is the fourth lifetime inpatient psychiatric admission since the suicides 

of her partner of 17years and her son within months of each other.  She has a 

history of sobriety prior to these events and since then has had difficulty 

with alcohol dependence.  She reports nonadherence to outpatient appointments 

and medications; minimizes alcohol use.  She continues to score on WAM and 

merits hospitalization for immediate safety and stabilization. 





Plan





- Plan


Treatment Plan: 


Name: REA SHERWOOD                        


YOB: 1975                        


[id number]


[id number]








continue acute intensive psychiatric treatment.  may decrease to q30min 

observation.  may allow staff pass.


add flexeril 10mg TID prn for neck strain/pain.  continue WAM. may use 

petroleum jelly topical for psoriasis. 


discharge planning to include outpatient referrals for substance use tx and 

case management. 


Continued Medication Management: Continue Outpt Medication


Medications: 


 Current Medications





Acetaminophen (Tylenol Tab*)  650 mg PO Q4H PRN


   PRN Reason: for pain; or Temp >101 F


   Last Admin: 04/14/19 13:41 Dose:  650 mg


Al Hydrox/Mg Hydrox/Simethicone (Maalox Plus*)  30 ml PO Q4H PRN


   PRN Reason: INDIGESTION


Albuterol (Ventolin Hfa Inhaler*)  2 puff INH Q4H PRN


   PRN Reason: WHEEZING


Cyclobenzaprine HCl (Flexeril Tab*)  10 mg PO TID PRN


   PRN Reason: PAIN


   Last Admin: 04/14/19 07:59 Dose:  10 mg


Device (Nicotine Mouth Piece*)  1 each INH .USE W/ CARTRIDGE PRN


   PRN Reason: WITHDRAWAL - NICOTINE


   Last Admin: 04/10/19 10:59 Dose:  1 each


Duloxetine HCl (Cymbalta Cap*)  60 mg PO BID [facility]


   Last Admin: 04/14/19 08:00 Dose:  60 mg


Folic Acid (Folvite Tab*)  1 mg PO DAILY [facility]


   Last Admin: 04/14/19 08:00 Dose:  1 mg


Gabapentin (Neurontin Cap(*))  800 mg PO TID [facility]


   Last Admin: 04/14/19 13:40 Dose:  800 mg


Hydroxyzine HCl (Atarax Tab*)  50 mg PO Q4H PRN


   PRN Reason: ANXIETY


   Last Admin: 04/14/19 12:48 Dose:  50 mg


Lidocaine (Lidoderm 5% Patch*)  1 patch TRANSDERM DAILY PRN


   PRN Reason: PAIN


   Last Admin: 04/13/19 20:28 Dose:  1 patch


Lorazepam (Ativan Tab(*))  0 - 6 mg PO .PER [facility] PROTOCOL [facility]; Protocol


   Last Admin: 04/14/19 17:04 Dose:  2 mg


Multivitamins/Minerals (Theragran/Minerals Tab*)  1 tab PO DAILY [facility]


   Last Admin: 04/14/19 08:00 Dose:  1 tab


Naltrexone HCl (Naltrexone Tab*)  50 mg PO DAILY [facility]; Protocol


   Last Admin: 04/14/19 08:00 Dose:  50 mg


Nicotine (Nicotine Inhaler*)  10 mg INH Q2H PRN


   PRN Reason: CRAVING


   Last Admin: 04/12/19 18:32 Dose:  10 mg


Nicotine (Nicotine Patch 21 Mg/24 Hr*)  1 patch TRANSDERM DAILY [facility]


   Last Admin: 04/14/19 08:01 Dose:  1 patch


Nicotine Polacrilex (Nicotine Gum*)  2 mg PO Q2H PRN


   PRN Reason: CRAVING


   Last Admin: 04/14/19 14:04 Dose:  2 mg


Pharmacy Profile Note (Nicotine Patch Removal Note*)  1 note FOLLOW UP 2100 [facility]


   Last Admin: 04/13/19 20:35 Dose:  1 note


Quetiapine Fumarate (Seroquel Xr Tab*)  300 mg PO BEDTIME [facility]


   Last Admin: 04/13/19 20:27 Dose:  300 mg


Thiamine HCl (Vitamin B-1 Tab*)  100 mg PO DAILY [facility]


   Last Admin: 04/14/19 08:00 Dose:  100 mg


Trazodone HCl (Desyrel Tab*)  100 mg PO BEDTIME [facility]


   Last Admin: 04/13/19 20:27 Dose:  100 mg











- Discharge Plan


Discharge Plan: Drug/Alcohol Rehab

## 2019-04-15 VITALS — SYSTOLIC BLOOD PRESSURE: 105 MMHG | DIASTOLIC BLOOD PRESSURE: 65 MMHG

## 2019-04-15 RX ADMIN — GABAPENTIN SCH MG: 400 CAPSULE ORAL at 08:04

## 2019-04-15 RX ADMIN — TRAZODONE HYDROCHLORIDE SCH MG: 100 TABLET ORAL at 20:20

## 2019-04-15 RX ADMIN — THERA TABS SCH TAB: TAB at 08:05

## 2019-04-15 RX ADMIN — ACETAMINOPHEN PRN MG: 325 TABLET ORAL at 17:50

## 2019-04-15 RX ADMIN — GABAPENTIN SCH MG: 400 CAPSULE ORAL at 20:18

## 2019-04-15 RX ADMIN — FOLIC ACID SCH MG: 1 TABLET ORAL at 08:05

## 2019-04-15 RX ADMIN — GABAPENTIN SCH MG: 400 CAPSULE ORAL at 15:28

## 2019-04-15 RX ADMIN — DULOXETINE HYDROCHLORIDE SCH MG: 60 CAPSULE, DELAYED RELEASE ORAL at 20:19

## 2019-04-15 RX ADMIN — NICOTINE SCH: 21 PATCH TRANSDERMAL at 08:04

## 2019-04-15 RX ADMIN — NALTREXONE HYDROCHLORIDE SCH MG: 50 TABLET, FILM COATED ORAL at 08:05

## 2019-04-15 RX ADMIN — QUETIAPINE FUMARATE SCH MG: 300 TABLET, FILM COATED, EXTENDED RELEASE ORAL at 20:20

## 2019-04-15 RX ADMIN — HYDROXYZINE HYDROCHLORIDE PRN MG: 50 TABLET, FILM COATED ORAL at 12:58

## 2019-04-15 RX ADMIN — LORAZEPAM SCH MG: 1 TABLET ORAL at 02:20

## 2019-04-15 RX ADMIN — CYCLOBENZAPRINE HYDROCHLORIDE PRN MG: 10 TABLET, FILM COATED ORAL at 02:20

## 2019-04-15 RX ADMIN — ACETAMINOPHEN PRN MG: 325 TABLET ORAL at 08:07

## 2019-04-15 RX ADMIN — Medication SCH MG: at 08:05

## 2019-04-15 RX ADMIN — CYCLOBENZAPRINE HYDROCHLORIDE PRN MG: 10 TABLET, FILM COATED ORAL at 09:01

## 2019-04-15 RX ADMIN — WATER SCH: 100 INJECTION, SOLUTION INTRAVENOUS at 20:21

## 2019-04-15 RX ADMIN — DULOXETINE HYDROCHLORIDE SCH MG: 60 CAPSULE, DELAYED RELEASE ORAL at 08:05

## 2019-04-15 RX ADMIN — LORAZEPAM SCH MG: 1 TABLET ORAL at 17:49

## 2019-04-15 NOTE — PN
Subjective





- Subjective


Date of Service: 04/15/19


Service Type: 73925 Hosp care 15 min low complexity


Subjective: 


Patient continues to score on WAM protocol.  Writer notifies her of concern of 

discharge during withdrawal process.  Patient appears frustrated and states 

"then I won't take it [lorazepam] if they offer it to me."  She is encouraged 

to assess withdrawal symptoms without lorazepam.  Writer validates frustration 

of not being discharged and encourages patient to identify unpleasant emotions 

to work through them.  





Per staff, there is suspicion that she was smoking in her bathroom.  








Objective





- General Observations


Appearance: Well Groomed


Stature: WNL


Posture: WNL


Eye Contact: Average


Behavior/Activity: WNL





- Interaction Observations


Attitude Towards Examiner: Dismissive


Stated Mood: Irritable


Affect: Restricted


Speech Pattern/Tone: Quiet Volume


Thought Process: Coherent


Perception: WNL


Thought Content: WNL


Hallucination Type: Denies


Delusion Type: Denies





- Cognitive Function


Orientation: A&O x 4


Level of Consciousness: Alert


Cognition: WNL


Estimated Intelligence: Normal


Insight: Difficulty Acknowledging Presence of Psyciatric Problems


Judgment Within Normal Limits: No


Ability to Make Reasonable Decisions: Moderately Impaired





- Medication Compliance


Cooperative with Inpatient Medication Regimen: Yes





- Group Participation


Participates in Group Activities: Partial





Assessment





- Assessment


Merits Inpatient Hospitalization: For Immediate Safety, For Stabilization


Inpatient DSM-V Dx: F33.2


Clinical Impression: 


44yo wf, domiciled, disabled with history of MDD, PTSD and alcohol dependence. 

This is the fourth lifetime inpatient psychiatric admission since the suicides 

of her partner of 17years and her son within months of each other.  She has a 

history of sobriety prior to these events and since then has had difficulty 

with alcohol dependence.  She reports nonadherence to outpatient appointments 

and medications; minimizes alcohol use.  She continues to score on WAM and 

merits hospitalization for immediate safety and stabilization. 





Plan





- Plan


Treatment Plan: 


Name: REA SHERWOOD                        


YOB: 1975                        


[id number]


[id number]








continue acute intensive psychiatric treatment.  may decrease to q30min 

observation.  may allow staff pass.


continue WAM. may use petroleum jelly topical for psoriasis. 


discharge planning to include outpatient referrals for substance use tx and 

case management. 


Medications: 


 Current Medications





Acetaminophen (Tylenol Tab*)  650 mg PO Q4H PRN


   PRN Reason: for pain; or Temp >101 F


   Last Admin: 04/15/19 08:07 Dose:  650 mg


Al Hydrox/Mg Hydrox/Simethicone (Maalox Plus*)  30 ml PO Q4H PRN


   PRN Reason: INDIGESTION


Albuterol (Ventolin Hfa Inhaler*)  2 puff INH Q4H PRN


   PRN Reason: WHEEZING


Cyclobenzaprine HCl (Flexeril Tab*)  10 mg PO TID PRN


   PRN Reason: PAIN


   Last Admin: 04/15/19 09:01 Dose:  10 mg


Device (Nicotine Mouth Piece*)  1 each INH .USE W/ CARTRIDGE PRN


   PRN Reason: WITHDRAWAL - NICOTINE


   Last Admin: 04/10/19 10:59 Dose:  1 each


Duloxetine HCl (Cymbalta Cap*)  [facility] BID [facility]


   Last Admin: 04/15/19 08:05 Dose:  60 mg


Folic Acid (Folvite Tab*)  1 mg PO DAILY [facility]


   Last Admin: 04/15/19 08:05 Dose:  1 mg


Gabapentin (Neurontin Cap(*))  800 mg PO TID [facility]


   Last Admin: 04/15/19 08:04 Dose:  800 mg


Hydroxyzine HCl (Atarax Tab*)  50 mg PO Q4H PRN


   PRN Reason: ANXIETY


   Last Admin: 04/14/19 12:48 Dose:  50 mg


Lidocaine (Lidoderm 5% Patch*)  1 patch TRANSDERM DAILY PRN


   PRN Reason: PAIN


   Last Admin: 04/13/19 20:28 Dose:  1 patch


Lorazepam (Ativan Tab(*))  0 - 6 mg PO .PER [facility] PROTOCOL [facility]; Protocol


   Last Admin: 04/15/19 02:20 Dose:  2 mg


Multivitamins/Minerals (Theragran/Minerals Tab*)  1 tab PO DAILY [facility]


   Last Admin: 04/15/19 08:05 Dose:  1 tab


Naltrexone HCl (Naltrexone Tab*)  50 mg PO DAILY [facility]; Protocol


   Last Admin: 04/15/19 08:05 Dose:  50 mg


Nicotine (Nicotine Inhaler*)  10 mg INH Q2H PRN


   PRN Reason: CRAVING


   Last Admin: 04/12/19 18:32 Dose:  10 mg


Nicotine (Nicotine Patch 21 Mg/24 Hr*)  1 patch TRANSDERM DAILY [facility]


   Last Admin: 04/15/19 08:04 Dose:  Not Given


Nicotine Polacrilex (Nicotine Gum*)  2 mg PO Q2H PRN


   PRN Reason: CRAVING


   Last Admin: 04/14/19 14:04 Dose:  2 mg


Pharmacy Profile Note (Nicotine Patch Removal Note*)  1 note FOLLOW UP 2100 [facility]


   Last Admin: 04/14/19 20:55 Dose:  1 note


Quetiapine Fumarate (Seroquel Xr Tab*)  300 mg PO BEDTIME [facility]


   Last Admin: 04/14/19 20:49 Dose:  300 mg


Thiamine HCl (Vitamin B-1 Tab*)  100 mg PO DAILY [facility]


   Last Admin: 04/15/19 08:05 Dose:  100 mg


Trazodone HCl (Desyrel Tab*)  100 mg PO BEDTIME [facility]


   Last Admin: 04/14/19 20:49 Dose:  100 mg











- Discharge Plan


Discharge Plan: Inpatient Hospitalization


Outpatient Program: [facility]

## 2019-04-16 RX ADMIN — GABAPENTIN SCH MG: 400 CAPSULE ORAL at 08:02

## 2019-04-16 RX ADMIN — HYDROXYZINE HYDROCHLORIDE PRN MG: 50 TABLET, FILM COATED ORAL at 08:04

## 2019-04-16 RX ADMIN — LIDOCAINE PRN PATCH: 50 PATCH CUTANEOUS at 08:05

## 2019-04-16 RX ADMIN — FOLIC ACID SCH MG: 1 TABLET ORAL at 08:03

## 2019-04-16 RX ADMIN — DULOXETINE HYDROCHLORIDE SCH MG: 60 CAPSULE, DELAYED RELEASE ORAL at 08:03

## 2019-04-16 RX ADMIN — THERA TABS SCH TAB: TAB at 08:03

## 2019-04-16 RX ADMIN — CYCLOBENZAPRINE HYDROCHLORIDE PRN MG: 10 TABLET, FILM COATED ORAL at 08:04

## 2019-04-16 RX ADMIN — ACETAMINOPHEN PRN MG: 325 TABLET ORAL at 08:59

## 2019-04-16 RX ADMIN — NALTREXONE HYDROCHLORIDE SCH MG: 50 TABLET, FILM COATED ORAL at 08:03

## 2019-04-16 RX ADMIN — Medication SCH MG: at 08:03

## 2019-04-16 RX ADMIN — NICOTINE SCH: 21 PATCH TRANSDERMAL at 08:03

## 2019-04-16 NOTE — DCNOTE
Subjective





- Subjective


Service Types: 67[address] [facility] Day Mgmt simple under 30 min


Discharge Date: 04/16/19


Subjective: 


Patient has successfully detoxed from alcohol.  She reports readiness to return 

home.  We discuss presentation during hospitalization and strong recommendation 

to follow up with appointments.  Writer encourages patient to prioritize her 

physical and mental health.  We discuss unpleasant emotions and coping with 

such.  She denies SI or passive death wish.  She reports liking the AA meetings 

she attended during hospitalization and desire to continue with AA in the area.

  





Objective





- Appearance


Appearance: Well Developed/Nourished


Dysmorphic Features: No


Hygiene: Normal


Grooming: Well Kept





- Behavior


Psychomotor Activities: Normal


Exhibits Abnormal Movement: No





- Attitude and Relatedness


Attitude and Relatedness: Cooperative


Eye Contact: Good





- Speech


Quality: Unpressured


Latencies: Normal


Quantity: Appropriate





- Mood


Patient's Decription of Mood: "Okay"





- Affect


Observed Affect: Good


Affect Consistent with: Euthymia





- Thought Process


Patient's Thought Process: Coherent, Goal Directed


Thought Content: No Passive Death Wish, No Suicidal Planning, No Homicidal 

Ideation, No Paranoid Ideation





- Sensorium


Experiencing Hallucinations: No, Sensorium is Clear


Type of Hallucinations: Visual: No, Auditory: No, Command: No





- Level of Consciousness


Level of Consciousness: Alert


Orientation: Yes Intact, Yes Orientated to Time, Yes Orientated to Place, Yes 

Orientated to Person





- Impulse Control


Impulse Control: Intact





- Insight and Judgement


Insight and Judgement: Fair





DC Assessment





- Assessment


Clinical Impression: 


44yo wf, domiciled, disabled with history of MDD, PTSD and alcohol dependence. 

This is the fourth lifetime inpatient psychiatric admission since the suicides 

of her partner of 17years and her son within months of each other.  She has a 

history of sobriety prior to these events and since then has had difficulty 

with alcohol dependence.  She reports nonadherence to outpatient appointments 

and medications; minimizes alcohol use.  She successfully detoxed from alcohol 

and participated in programming.  


Merits Inpatient Hospitalization: No


Clear for Discharge: Adequate Clinical Respons, Acceptable Safety Profile


Inpatient DSM-V Dx: F33.2





Discharge Planning





- Discharge Planning


Discharge Plan: Outpatient Follow Up


Outpatient Program: [facility] [place]


Recommendations for Continuing Care: Medication Management, Psychotherapy, 

Substance Abuse Counseling, Primary Care Followup


Medications: 


 Current Medications








Albuterol (Ventolin Hfa Inhaler*)  2 puff INH Q4H PRN


   PRN Reason: WHEEZING


Cyclobenzaprine HCl (Flexeril Tab*)  10 mg PO TID PRN


   PRN Reason: PAIN


   Last Admin: 04/16/19 08:04 Dose:  10 mg


Duloxetine HCl (Cymbalta Cap*)  60 mg PO BID [facility]


   Last Admin: 04/16/19 08:03 Dose:  60 mg


Gabapentin (Neurontin Cap(*))  800 mg PO TID [facility]


   Last Admin: 04/16/19 08:02 Dose:  800 mg


Hydroxyzine HCl (Atarax Tab*)  50 mg PO Q4H PRN


   PRN Reason: ANXIETY


   Last Admin: 04/16/19 08:04 Dose:  50 mg


Lidocaine (Lidoderm 5% Patch*)  1 patch TRANSDERM DAILY PRN


   PRN Reason: PAIN


   Last Admin: 04/16/19 08:05 Dose:  1 patch


Naltrexone HCl (Naltrexone Tab*)  50 mg PO DAILY [facility]; Protocol


   Last Admin: 04/16/19 08:03 Dose:  50 mg


Quetiapine Fumarate (Seroquel Xr Tab*)  300 mg PO BEDTIME [facility]


   Last Admin: 04/15/19 20:20 Dose:  300 mg


Trazodone HCl (Desyrel Tab*)  100 mg PO BEDTIME [facility]


   Last Admin: 04/15/19 20:20 Dose:  100 mg








Discharge Planning: 


Prescriptions provided for discharge                  [x] Yes   [] No   





Follow up care details as per social work arrangements:


PCP appointment with Dr. Gama on Wednesday, April 17th at 1:00PM.  


Dr. Gallardo on Thursday, April 18th at 2:00PM at [facility] [place]: appointment on Thursday, April 24th at 1:30 with 

Otto Jenkins.


Critical access hospital: appointment with Terrence on Friday, April 26th at 11:00AM


Patient response to discharge plan:   


                                                                 [x] eager for 

discharge


                                    [x] agreeable with discharge plan


                                  [] ambivalent about discharge


                                   [] disagrees with discharge today

## 2019-04-17 NOTE — DS
CC:  Dr. Gama, [facility]; [facility]; [facility] *

 

DISCHARGE SUMMARY:

 

DATE OF ADMISSION:  04/10/19

 

DATE OF DISCHARGE:  04/16/19

 

SUPERVISING PSYCHIATRIST:  Dr. Faustino Cornejo.* (DICTATED BY CHIVO GENTILE NP)

 

DIAGNOSES:  Major depressive disorder, recurrent, severe without psychotic 
features; posttraumatic stress disorder; alcohol use disorder; tobacco use 
disorder.

 

CONDITION AT TIME OF DISCHARGE:  Improved.  Patient is euthymic with full range 
of affect.  She has successfully detoxed from alcohol and expresses readiness 
to return home.  We discussed presentation during hospitalization and strong 
recommendation to follow up with scheduled appointments.  Writer encourages 
patient to prioritize her physical and mental health.  We discussed unpleasant 
emotions and coping with such.  She denies SI or passive death wish.  She 
reports liking the AA meetings she attended during hospitalization and a desire 
to continue with AA in the area.

 

MENTAL STATUS EXAM:  Angeline is a 43-year-old white female, well groomed, who 
presents as stated age.  She is casually dressed in her own clothing and walks 
about with a walker from our unit.  She sits with erect posture when discussing 
discharge planning with writer.  She is alert and oriented x3.  Eye contact is 
intermittent.  Speech is soft, articulate, and spontaneous.  Mood is euthymic 
with full range of affect.  No abnormal psychomotor activity noted.  Thought 
process is circumstantial, logical, and coherent.  Thought content is negative 
for SI, HI, or passive death wish.  She denies auditory or visual 
hallucinations.  There are no perceptual disturbances noted.  Insight and 
judgment are fair.  Fund of knowledge is adequate.

 

INSTRUCTIONS GIVEN TO PATIENT: 

A.  Medications:

1.  Albuterol 2 puffs inhaled q.4 hours p.r.n. for wheezing.

2.  Cyclobenzaprine 10 mg p.o. t.i.d. p.r.n. for back pain.

3.  Duloxetine 60 mg p.o. b.i.d.

4.  Gabapentin 800 mg p.o. t.i.d.

5.  Hydroxyzine 50 mg p.o. q.4 hours p.r.n. for anxiety.

6.  Lidocaine patch 1 daily for 12 hours.

7.  Naltrexone 50 mg p.o. daily.

8.  Quetiapine  mg p.o. q.h.s.

9.  Trazodone 100 mg at bedtime.

 

The above prescriptions were electronically sent for a 1-week supply as she has 
a followup with primary care tomorrow.

 

B.  Diet is regular.

 

C.  Activity:  Ambulation as tolerated.  Tobacco cessation was declined by 
patient. There are no pending labs or diagnostic studies.

 

D:  Followup care:  The patient was given appointments at [facility] to meet with Dr. Gallardo on Thursday, 04/18/19, and with her 
therapist, Terrence, on 04/26/19.  She was referred for substance use treatment 
to the [facility] with an appointment on 04/24/19 and she was 
given an appointment for primary care with Dr. Gama tomorrow, Wednesday, 04/17/
19.

 

E:  Substance use followup:  As above, [facility] and the patient 
agreed to naltrexone for alcohol use disorder.

 

HOSPITAL COURSE: Part A:  Reason for Admission:  The patient presented to the 
emergency department on the evening of 04/09/19 via ambulance requesting 
admission to mental health unit.  She had not been taking prescribed 
medications and was drinking daily, although she minimized alcohol intake.

 

History of Present Illness:  This is the fourth psychiatric hospitalization for 
a 43-year-old single, domiciled, medically disabled white female with a history 
of PTSD and alcohol dependence.  Her most recent hospitalization was in March 2018. The patient has a history of sobriety for 8 years until the suicide death 
of her boyfriend in August 2017 led to relapse.  Since that time, she has had 
difficulty abstaining from alcohol and maintaining on mental health 
medications. Unfortunately, shortly after the August 2017 suicide of her long-
term partner, her son hung himself in January 2018.  We saw her in March after 
his suicide.  Since that time, she has been a client at [facility] with poor adherence.  She sees Terrence and Dr. Gallardo.  The patient 
reports last November, she moved to her own apartment through the [place] 
[employer] and appreciates living on her own.  She states that she has 
friends in the apartment complex and attends a lunch program in [place].  
Otherwise, she is primarily isolated and spends much time watching television.  
She reports decreased motivation, hopelessness, and helplessness.  She reports 
excessive guilt.  She states that she either sleeps a lot or not at all.  She 
denies having nightmares or flashbacks.  She reports a pattern of worsening 
depression every couple of months.  She states that there have been 2 episodes 
since her last admission where she had lots of energy for a couple of days at a 
time.  She states that she got many projects done, but continued to sleep 6 to 
8 hours at night.  She denies auditory or visual hallucinations.  She reports 
frequent handwashing and does not tolerate dust in her home.  She denies other 
obsessions or compulsions.  She denies intrusive thoughts or phobias.  The 
patient states she is anxious in public areas and traveling via bus; these are 
barriers to attending appointments.  She is tearful when discussing her son's 
and boyfriend/Jax's suicide.  She is wearing a sweatshirt with Victor Manuel/her son's 
photo on it.  I recalled that she wore this sweatshirt throughout the 
hospitalization last year as well.  The patient states she went to [employer] a 
couple of times and just "sat there and cried."  Otherwise, she is having 
difficulty with Mandaeism.  Last admission, she reported a desire to follow up 
with the pain clinic, but did not do so, per the patient.  She endorses passive 
death wish.  She denies suicide attempts.  She denies HI or VI.

 

Part B:  Psychiatric treatment rendered.  The patient was admitted to adult 
behavioral services unit on voluntary status.  Her code status was full.  She 
was placed on 15-minute checks for her safety.  We monitored her for alcohol 
withdrawal and she continued to score on the WAM protocol up until the day 
before discharge. We increased duloxetine to 60 mg p.o. b.i.d. to better target 
depression and anxiety.  As stated above, the patient accepted offer of 
naltrexone p.o. for alcohol use disorder.  We discussed the risk of drinking 
with this medication and that she would likely not feel intoxicated, but would 
be physically so.  The patient was relatively participatory, especially after 
the first 2 days of admission.  She attended some groups; otherwise, was 
seclusive to her room.  She reported a desire to be discharged on Monday; 
however, due to her continuing to score on WAM, I was not comfortable with 
discharging her that day.  As we were discussing this in treatment team, it 
turns out that the patient had been given her belongings to her by staff and 
took this opportunity to take a couple of puffs of a cigarette in her room.  
The patient was notified of change in discharge date by one day due to 
continued symptoms of alcohol withdrawal and cravings.  Her impulsive behavior 
of smoking a cigarette was also indicative that it would have been in poor 
judgment to discharge her that day.  The patient was encouraged to identify 
emotions about not being discharged and to work through those with staff.  She 
attended AA meeting that night as well on the unit.  The following day, we 
discussed the above and the patient stated understanding of writer's rationale. 
Again, she was encouraged to continue to prioritize her physical and mental 
health and to follow through on appointments set up for her.  The patient was 
discharged to home.  Her friend, Richard, was here to give her a ride.

 

____________________________________ CHIVO GENTILE, CRISTY

 

[id number]/CPS #: [id number]

GELACIO

## 2019-08-09 ENCOUNTER — HOSPITAL ENCOUNTER (EMERGENCY)
Dept: HOSPITAL 25 - ED | Age: 44
LOS: 1 days | Discharge: HOME | End: 2019-08-10
Payer: MEDICAID

## 2019-08-09 DIAGNOSIS — F32.9: ICD-10-CM

## 2019-08-09 DIAGNOSIS — N93.9: Primary | ICD-10-CM

## 2019-08-09 DIAGNOSIS — Z91.048: ICD-10-CM

## 2019-08-09 DIAGNOSIS — F17.210: ICD-10-CM

## 2019-08-09 DIAGNOSIS — R10.9: ICD-10-CM

## 2019-08-09 DIAGNOSIS — Z88.6: ICD-10-CM

## 2019-08-09 DIAGNOSIS — F41.9: ICD-10-CM

## 2019-08-09 DIAGNOSIS — E07.9: ICD-10-CM

## 2019-08-09 DIAGNOSIS — J45.909: ICD-10-CM

## 2019-08-09 LAB
ALBUMIN SERPL BCG-MCNC: 4.1 G/DL (ref 3.2–5.2)
ALBUMIN/GLOB SERPL: 1.2 {RATIO} (ref 1–3)
ALP SERPL-CCNC: 89 U/L (ref 34–104)
ALT SERPL W P-5'-P-CCNC: 25 U/L (ref 7–52)
ANION GAP SERPL CALC-SCNC: 15 MMOL/L (ref 2–11)
AST SERPL-CCNC: 44 U/L (ref 13–39)
BASOPHILS # BLD AUTO: 0.1 10^3/UL (ref 0–0.2)
BUN SERPL-MCNC: 9 MG/DL (ref 6–24)
BUN/CREAT SERPL: 20.9 (ref 8–20)
CALCIUM SERPL-MCNC: 8.8 MG/DL (ref 8.6–10.3)
CHLORIDE SERPL-SCNC: 103 MMOL/L (ref 101–111)
EOSINOPHIL # BLD AUTO: 0 10^3/UL (ref 0–0.6)
GLOBULIN SER CALC-MCNC: 3.4 G/DL (ref 2–4)
GLUCOSE SERPL-MCNC: 82 MG/DL (ref 70–100)
HCG SERPL QL: < 0.6 MIU/ML
HCO3 SERPL-SCNC: 24 MMOL/L (ref 22–32)
HCT VFR BLD AUTO: 43 % (ref 35–47)
HGB BLD-MCNC: 14.4 G/DL (ref 12–16)
INR PPP/BLD: 0.91 (ref 0.82–1.09)
LYMPHOCYTES # BLD AUTO: 3.5 10^3/UL (ref 1–4.8)
MCH RBC QN AUTO: 32 PG (ref 27–31)
MCHC RBC AUTO-ENTMCNC: 34 G/DL (ref 31–36)
MCV RBC AUTO: 94 FL (ref 80–97)
MONOCYTES # BLD AUTO: 0.4 10^3/UL (ref 0–0.8)
NEUTROPHILS # BLD AUTO: 2.1 10^3/UL (ref 1.5–7.7)
NRBC # BLD AUTO: 0 10^3/UL
NRBC BLD QL AUTO: 0.1
PLATELET # BLD AUTO: 290 10^3/UL (ref 150–450)
POTASSIUM SERPL-SCNC: 3.7 MMOL/L (ref 3.5–5)
PROT SERPL-MCNC: 7.5 G/DL (ref 6.4–8.9)
RBC # BLD AUTO: 4.54 10^6 /UL (ref 3.7–4.87)
SODIUM SERPL-SCNC: 142 MMOL/L (ref 135–145)
WBC # BLD AUTO: 6 10^3/UL (ref 3.5–10.8)

## 2019-08-09 PROCEDURE — 85025 COMPLETE CBC W/AUTO DIFF WBC: CPT

## 2019-08-09 PROCEDURE — 84702 CHORIONIC GONADOTROPIN TEST: CPT

## 2019-08-09 PROCEDURE — 76830 TRANSVAGINAL US NON-OB: CPT

## 2019-08-09 PROCEDURE — 99284 EMERGENCY DEPT VISIT MOD MDM: CPT

## 2019-08-09 PROCEDURE — 96374 THER/PROPH/DIAG INJ IV PUSH: CPT

## 2019-08-09 PROCEDURE — 85610 PROTHROMBIN TIME: CPT

## 2019-08-09 PROCEDURE — 36415 COLL VENOUS BLD VENIPUNCTURE: CPT

## 2019-08-09 PROCEDURE — 86900 BLOOD TYPING SEROLOGIC ABO: CPT

## 2019-08-09 PROCEDURE — G0480 DRUG TEST DEF 1-7 CLASSES: HCPCS

## 2019-08-09 PROCEDURE — 86901 BLOOD TYPING SEROLOGIC RH(D): CPT

## 2019-08-09 PROCEDURE — 80053 COMPREHEN METABOLIC PANEL: CPT

## 2019-08-09 PROCEDURE — 80320 DRUG SCREEN QUANTALCOHOLS: CPT

## 2019-08-09 PROCEDURE — 86850 RBC ANTIBODY SCREEN: CPT

## 2019-08-09 PROCEDURE — 96361 HYDRATE IV INFUSION ADD-ON: CPT

## 2019-08-10 VITALS — DIASTOLIC BLOOD PRESSURE: 84 MMHG | SYSTOLIC BLOOD PRESSURE: 127 MMHG

## 2019-08-10 NOTE — ED
GI/ HPI





- HPI Summary


HPI Summary: 


43-year-old female presents with vaginal bleeding for the past couple days.  

She states she had not had her period for past 3 months.  States she is 

concerned that she is pregnant.  States she is passing a lot of clots.  She is 

an alcoholic.  Last drink was 3 hours ago.  Has history of DTs and is concerned 

that may go into such.  She denies any nausea vomiting.  No diarrhea 

constipation.  No urinary symptoms. 





- History of Current Complaint


Chief Complaint: EDVaginalBleeding


Time Seen by Provider: 19 22:06


Stated Complaint: VAGINAL BLEED PER EMS


Pain Intensity: 8





- Additional Pertinent History


Primary Care Physician: [id number]





- Allergy/Home Medications


Allergies/Adverse Reactions: 


 Allergies











Allergy/AdvReac Type Severity Reaction Status Date / Time


 


Adhesive Tape [Paper Tape] Allergy  Itching Verified 19 23:13


 


pregabalin [From Lyrica] Allergy  Swelling Verified 19 23:13














PMH/Surg Hx/FS Hx/Imm Hx


Endocrine/Hematology History: Reports: Hx Thyroid Disease - Pt states she used 

to take thyroid medication


   Denies: Hx Diabetes


Cardiovascular History: 


   Denies: Hx Hypertension, Hx Pacemaker/ICD


Respiratory History: Reports: Hx Asthma


GI History: Reports: Other GI Disorders - hx of fatty liver disease


Musculoskeletal History: Reports: Hx Back Problems, Other Musculoskeletal 

History - hx of degenerative disc disease


Sensory History: Reports: Hx Contacts or Glasses


   Denies: Hx Hearing Aid


Opthamlomology History: Reports: Hx Contacts or Glasses


Neurological History: Reports: Hx Seizures - d/t etoh withdrawal 7 years ago


Psychiatric History: Reports: Hx Anxiety, Hx Depression, Hx Inpatient Treatment

, Hx Community Mental Health Tx, Hx Substance Abuse


   Denies: Hx Eating Disorder, Hx Panic Disorder, Hx of Violent Episodes 

Against Others





- Surgical History


Surgery Procedure, Year, and Place:  & LSP SURGERY X2.  CSP FUSION 

2013.  





- Immunization History


Date of Tetanus Vaccine: utd


Date of Influenza Vaccine: none


Immunizations Up to Date: Yes


Infectious Disease History: No


Infectious Disease History: 


   Denies: Hx of Known/Suspected MRSA, Traveled Outside the [place] in Last 30 Days





- Family History


Known Family History: Positive: Other


   Negative: Diabetes


Family History: alcoholism, SI





- Social History


Alcohol Use: Daily


Alcohol Amount: 1.75 L vodka


Hx Substance Use: No


Substance Use Type: Reports: None


Substance Use Comment - Amount & Last Used: Pt denies marijuana use anymore d/t 

contract w/ pain clinic


Hx Tobacco Use: Yes


Smoking Status (MU): Heavy Every Day Tobacco Smoker


Type: Cigarettes


Amount Used/How Often: states she smokes 1 PPD


Have You Smoked in the Last Year: Yes





Review of Systems


Negative: Fever


Negative: Chest Pain


Negative: Shortness Of Breath


Positive: Abdominal Pain, Other - vaginal bleeding


All Other Systems Reviewed And Are Negative: Yes





Physical Exam


Triage Information Reviewed: Yes


Vital Signs On Initial Exam: 


 Initial Vitals











Temp Pulse Resp BP Pulse Ox


 


 97.9 F   81   17   115/81   97 


 


 19 21:59  19 21:59  19 21:59  19 21:59  19 21:59











Vital Signs Reviewed: Yes


Appearance: Positive: Well-Appearing


Skin: Positive: Warm, Dry


Head/Face: Positive: Normal Head/Face Inspection


Eyes: Positive: Normal, Conjunctiva Clear


ENT: Positive: Pharynx normal


Respiratory/Lung Sounds: Positive: Clear to Auscultation, Breath Sounds Present


Cardiovascular: Positive: Normal, RRR


Abdomen Description: Positive: Soft, Other: - tenderness in LLQ, no rebound


Bowel Sounds: Positive: Present


Musculoskeletal: Positive: Normal


Neurological: Positive: Normal


Psychiatric: Positive: Normal





Diagnostics





- Vital Signs


 Vital Signs











  Temp Pulse Resp BP Pulse Ox


 


 19 23:08    19  


 


 19 23:00    19  


 


 19 22:59   86  17  119/81  92


 


 19 22:33   86  20  116/79  93


 


 19 22:01   81  16   95


 


 19 21:59  97.9 F  81  17  115/81  98














- Laboratory


Lab Results: 


 Lab Results











  19 Range/Units





  22:21 22:21 22:22 


 


WBC  6.0    (3.5-10.8)  10^3/uL


 


RBC  4.54    (3.70-4.87)  10^6 /uL


 


Hgb  14.4    (12.0-16.0)  g/dL


 


Hct  43    (35-47)  %


 


MCV  94    (80-97)  fL


 


MCH  32 H    (27-31)  pg


 


MCHC  34    (31-36)  g/dL


 


RDW  20 H    (10-15)  %


 


Plt Count  290    (150-450)  10^3/uL


 


MPV  7.8    (7.4-10.4)  fL


 


Neut % (Auto)  34.4    %


 


Lymph % (Auto)  57.7    %


 


Mono % (Auto)  6.5    %


 


Eos % (Auto)  0.4    %


 


Baso % (Auto)  1.0    %


 


Absolute Neuts (auto)  2.1    (1.5-7.7)  10^3/ul


 


Absolute Lymphs (auto)  3.5    (1.0-4.8)  10^3/ul


 


Absolute Monos (auto)  0.4    (0-0.8)  10^3/ul


 


Absolute Eos (auto)  0.0    (0-0.6)  10^3/ul


 


Absolute Basos (auto)  0.1    (0-0.2)  10^3/ul


 


Absolute Nucleated RBC  0.0    10^3/ul


 


Nucleated RBC %  0.1    


 


INR (Anticoag Therapy)   0.91   (0.82-1.09)  


 


Sodium    142  (135-145)  mmol/L


 


Potassium    3.7  (3.5-5.0)  mmol/L


 


Chloride    103  (101-111)  mmol/L


 


Carbon Dioxide    24  (22-32)  mmol/L


 


Anion Gap    15 H  (2-11)  mmol/L


 


BUN    9  (6-24)  mg/dL


 


Creatinine    0.43 L  (0.51-0.95)  mg/dL


 


Est GFR ( Amer)    192.1  (>60)  


 


Est GFR (Non-Af Amer)    158.8  (>60)  


 


BUN/Creatinine Ratio    20.9 H  (8-20)  


 


Glucose    82  ()  mg/dL


 


Calcium    8.8  (8.6-10.3)  mg/dL


 


Total Bilirubin    0.30  (0.2-1.0)  mg/dL


 


AST    44 H  (13-39)  U/L


 


ALT    25  (7-52)  U/L


 


Alkaline Phosphatase    89  ()  U/L


 


Total Protein    7.5  (6.4-8.9)  g/dL


 


Albumin    4.1  (3.2-5.2)  g/dL


 


Globulin    3.4  (2-4)  g/dL


 


Albumin/Globulin Ratio    1.2  (1-3)  


 


Beta HCG, Quant    < 0.60  mIU/mL


 


Serum Alcohol     (<10)  mg/dL


 


Blood Type     


 


Antibody Screen     














  19 Range/Units





  22:23 22:23 


 


WBC    (3.5-10.8)  10^3/uL


 


RBC    (3.70-4.87)  10^6 /uL


 


Hgb    (12.0-16.0)  g/dL


 


Hct    (35-47)  %


 


MCV    (80-97)  fL


 


MCH    (27-31)  pg


 


MCHC    (31-36)  g/dL


 


RDW    (10-15)  %


 


Plt Count    (150-450)  10^3/uL


 


MPV    (7.4-10.4)  fL


 


Neut % (Auto)    %


 


Lymph % (Auto)    %


 


Mono % (Auto)    %


 


Eos % (Auto)    %


 


Baso % (Auto)    %


 


Absolute Neuts (auto)    (1.5-7.7)  10^3/ul


 


Absolute Lymphs (auto)    (1.0-4.8)  10^3/ul


 


Absolute Monos (auto)    (0-0.8)  10^3/ul


 


Absolute Eos (auto)    (0-0.6)  10^3/ul


 


Absolute Basos (auto)    (0-0.2)  10^3/ul


 


Absolute Nucleated RBC    10^3/ul


 


Nucleated RBC %    


 


INR (Anticoag Therapy)    (0.82-1.09)  


 


Sodium    (135-145)  mmol/L


 


Potassium    (3.5-5.0)  mmol/L


 


Chloride    (101-111)  mmol/L


 


Carbon Dioxide    (22-32)  mmol/L


 


Anion Gap    (2-11)  mmol/L


 


BUN    (6-24)  mg/dL


 


Creatinine    (0.51-0.95)  mg/dL


 


Est GFR ( Amer)    (>60)  


 


Est GFR (Non-Af Amer)    (>60)  


 


BUN/Creatinine Ratio    (8-20)  


 


Glucose    ()  mg/dL


 


Calcium    (8.6-10.3)  mg/dL


 


Total Bilirubin    (0.2-1.0)  mg/dL


 


AST    (13-39)  U/L


 


ALT    (7-52)  U/L


 


Alkaline Phosphatase    ()  U/L


 


Total Protein    (6.4-8.9)  g/dL


 


Albumin    (3.2-5.2)  g/dL


 


Globulin    (2-4)  g/dL


 


Albumin/Globulin Ratio    (1-3)  


 


Beta HCG, Quant    mIU/mL


 


Serum Alcohol   335 H  (<10)  mg/dL


 


Blood Type  A Positive   


 


Antibody Screen  Negative   











Result Diagrams: 


 19 22:21





 19 22:22


Lab Statement: Any lab studies that have been ordered have been reviewed, and 

results considered in the medical decision making process.





- Ultrasound


  ** No standard instances


Ultrasound Interpretation Completed By: Radiologist


Summary of Ultrasound Findings: IMPRESSION:  3.3 x 3 x 3.5 cm right ovarian 

cyst containing low level internal echoes.  Left ovary is obscured by bowel gas.





Re-Evaluation





- Re-Evaluation


  ** First Eval


Re-Evaluation Time: 00:55


Change: Improved


Comment: feeling better





GIGU Course/Dx





- Course


Course Of Treatment: 43-year-old female presents with vaginal bleeding for the 

past couple days.  She states she had not had her period for past 3 months.  

States she is concerned that she is pregnant.  States she is passing a lot of 

clots.  She is an alcoholic.  Last drink was 3 hours ago.  Has history of DTs 

and is concerned that may go into such.  She denies any nausea vomiting.  No 

diarrhea constipation.  No urinary symptoms.  On exam has mild tenderness in 

left lower quadrant.  No rebound.  Patient does not appear comfortable. Has 

some tremors noted.  Gave dosed Ativan to prevent DTs from occurring.  HCG is 

negative.  Patient is not anemic.  Ultrasound shows a right ovarian cyst.  

Discuss with patient that due to smoking can not start birth control.  bleeding 

is likely her normal period.  Told to take ibuprofen for pain. told follow up 

with gyn.  Patient understands agrees plan.





- Diagnoses


Differential Diagnoses - Female: Ovarian Cyst, Pregnancy, Urinary Tract 

Infection


Provider Diagnoses: 


 Vaginal bleeding, Abdominal pain








Discharge





- Sign-Out/Discharge


Documenting (check all that apply): Patient Departure


Patient Received Moderate/Deep Sedation with Procedure: No





- Discharge Plan


Condition: Good


Disposition: HOME


Patient Education Materials:  Dysfunctional Uterine Bleeding (ED)


Referrals: 


Natan MIXON,Quinn BYRNE [Primary Care Provider] - 


Sarita Pulido MD [Medical Doctor] - 


Additional Instructions: 


take ibuprofen every 6 hours for pain and bleeding


drink plenty of fluids


Follow up with gyn


Return to ED if develop any new or worsening symptoms





- Billing Disposition and Condition


Condition: GOOD


Disposition: Home

## 2019-08-31 ENCOUNTER — HOSPITAL ENCOUNTER (EMERGENCY)
Dept: HOSPITAL 25 - ED | Age: 44
Discharge: HOME | End: 2019-08-31
Payer: MEDICAID

## 2019-08-31 VITALS — DIASTOLIC BLOOD PRESSURE: 89 MMHG | SYSTOLIC BLOOD PRESSURE: 143 MMHG

## 2019-08-31 DIAGNOSIS — F32.9: ICD-10-CM

## 2019-08-31 DIAGNOSIS — F41.9: ICD-10-CM

## 2019-08-31 DIAGNOSIS — F17.210: ICD-10-CM

## 2019-08-31 DIAGNOSIS — Z79.899: ICD-10-CM

## 2019-08-31 DIAGNOSIS — Z88.8: ICD-10-CM

## 2019-08-31 DIAGNOSIS — R10.9: ICD-10-CM

## 2019-08-31 DIAGNOSIS — F10.929: Primary | ICD-10-CM

## 2019-08-31 LAB
ALBUMIN SERPL BCG-MCNC: 4.1 G/DL (ref 3.2–5.2)
ALBUMIN/GLOB SERPL: 1.2 {RATIO} (ref 1–3)
ALP SERPL-CCNC: 104 U/L (ref 34–104)
ALT SERPL W P-5'-P-CCNC: 93 U/L (ref 7–52)
AMYLASE SERPL-CCNC: 27 U/L (ref 29–103)
ANION GAP SERPL CALC-SCNC: 18 MMOL/L (ref 2–11)
AST SERPL-CCNC: 187 U/L (ref 13–39)
BASOPHILS # BLD AUTO: 0 10^3/UL (ref 0–0.2)
BUN SERPL-MCNC: 10 MG/DL (ref 6–24)
BUN/CREAT SERPL: 18.9 (ref 8–20)
CALCIUM SERPL-MCNC: 8.8 MG/DL (ref 8.6–10.3)
CHLORIDE SERPL-SCNC: 105 MMOL/L (ref 101–111)
EOSINOPHIL # BLD AUTO: 0 10^3/UL (ref 0–0.6)
GLOBULIN SER CALC-MCNC: 3.5 G/DL (ref 2–4)
GLUCOSE SERPL-MCNC: 80 MG/DL (ref 70–100)
HCG SERPL QL: < 0.6 MIU/ML
HCO3 SERPL-SCNC: 20 MMOL/L (ref 22–32)
HCT VFR BLD AUTO: 42 % (ref 35–47)
HGB BLD-MCNC: 14.5 G/DL (ref 12–16)
INR PPP/BLD: 0.92 (ref 0.82–1.09)
LYMPHOCYTES # BLD AUTO: 2.9 10^3/UL (ref 1–4.8)
MCH RBC QN AUTO: 33 PG (ref 27–31)
MCHC RBC AUTO-ENTMCNC: 34 G/DL (ref 31–36)
MCV RBC AUTO: 96 FL (ref 80–97)
MONOCYTES # BLD AUTO: 0.6 10^3/UL (ref 0–0.8)
NEUTROPHILS # BLD AUTO: 2.1 10^3/UL (ref 1.5–7.7)
NRBC # BLD AUTO: 0 10^3/UL
NRBC BLD QL AUTO: 0.1
PLATELET # BLD AUTO: 273 10^3/UL (ref 150–450)
POTASSIUM SERPL-SCNC: 3.9 MMOL/L (ref 3.5–5)
PROT SERPL-MCNC: 7.6 G/DL (ref 6.4–8.9)
RBC # BLD AUTO: 4.44 10^6 /UL (ref 3.7–4.87)
SODIUM SERPL-SCNC: 143 MMOL/L (ref 135–145)
WBC # BLD AUTO: 5.6 10^3/UL (ref 3.5–10.8)

## 2019-08-31 PROCEDURE — 85025 COMPLETE CBC W/AUTO DIFF WBC: CPT

## 2019-08-31 PROCEDURE — 36415 COLL VENOUS BLD VENIPUNCTURE: CPT

## 2019-08-31 PROCEDURE — 85610 PROTHROMBIN TIME: CPT

## 2019-08-31 PROCEDURE — 80320 DRUG SCREEN QUANTALCOHOLS: CPT

## 2019-08-31 PROCEDURE — 80053 COMPREHEN METABOLIC PANEL: CPT

## 2019-08-31 PROCEDURE — 96365 THER/PROPH/DIAG IV INF INIT: CPT

## 2019-08-31 PROCEDURE — 82150 ASSAY OF AMYLASE: CPT

## 2019-08-31 PROCEDURE — G0480 DRUG TEST DEF 1-7 CLASSES: HCPCS

## 2019-08-31 PROCEDURE — 80307 DRUG TEST PRSMV CHEM ANLYZR: CPT

## 2019-08-31 PROCEDURE — 81003 URINALYSIS AUTO W/O SCOPE: CPT

## 2019-08-31 PROCEDURE — 96366 THER/PROPH/DIAG IV INF ADDON: CPT

## 2019-08-31 PROCEDURE — 83690 ASSAY OF LIPASE: CPT

## 2019-08-31 PROCEDURE — 99283 EMERGENCY DEPT VISIT LOW MDM: CPT

## 2019-08-31 PROCEDURE — 84702 CHORIONIC GONADOTROPIN TEST: CPT

## 2019-08-31 PROCEDURE — 96361 HYDRATE IV INFUSION ADD-ON: CPT

## 2019-08-31 NOTE — ED
Complex/Multi-Sys Presentation





- HPI Summary


HPI Summary: 


Patient is a 44 y/o F presenting to ED with complaints of RUQ abdominal pain 

and bilateral leg weakness. She also claims that she had a fever, but on vitals

, temp is 98.3 F. She reports onset of Sx yesterday, 19. N/V/D are denied. 

RUQ pain radiates to her LUQ. Patient describes herself as an alcoholic. She 

states that she is unsure how much alcohol she consumed today but claims that 

she last drank in the morning. Patient notes that she has rashes to her arms, 

legs, and backside. PMHx of  fatty liver disease, psorasis is reported. Patient 

denies being in contact with anyone with a rash but notes that she recently 

stayed in a motel. PMHx of carpal tunnel is endorsed. She states that she is 

scheduled for neck surgery. Patient has not taken any medications for her Sx. 

PMHx of anxiety, patient has not taken her anxiety medications today as she 

does not take them when she consumes alcohol. Home medications and allergies 

are reviewed. 








- History Of Current Complaint


Chief Complaint: EDAbdPain


Time Seen by Provider: 19 18:21


Hx Obtained From: Patient


Onset/Duration: Lasting Days - onset yesterday, 19


Timing: Days - onset yesterday, 19


Location: Pain At: - abdomen


Associated Signs And Symptoms: Positive: Weakness - bilateral leg weakness, 

Abdominal Pain, Fever - reported, on vitals, temp is 98.3 F, Other - positive - 

alcohol consumption.  Negative: Nausea, Vomiting, Diarrhea





- Allergies/Home Medications


Allergies/Adverse Reactions: 


 Allergies











Allergy/AdvReac Type Severity Reaction Status Date / Time


 


Adhesive Tape [Paper Tape] Allergy  Itching Verified 19 18:22


 


pregabalin [From Lyrica] Allergy  Swelling Verified 19 18:22














PMH/Surg Hx/FS Hx/Imm Hx


Endocrine/Hematology History: Reports: Hx Thyroid Disease - Pt states she used 

to take thyroid medication


   Denies: Hx Diabetes


Cardiovascular History: 


   Denies: Hx Hypertension, Hx Pacemaker/ICD


Respiratory History: Reports: Hx Asthma


GI History: Reports: Other GI Disorders - hx of fatty liver disease


Musculoskeletal History: Reports: Hx Back Problems, Other Musculoskeletal 

History - hx of degenerative disc disease


Sensory History: Reports: Hx Contacts or Glasses


   Denies: Hx Hearing Aid


Opthamlomology History: Reports: Hx Contacts or Glasses


Neurological History: Reports: Hx Seizures - d/t etoh withdrawal 7 years ago


Psychiatric History: Reports: Hx Anxiety, Hx Depression, Hx Inpatient Treatment

, Hx Community Mental Health Tx, Hx Substance Abuse


   Denies: Hx Eating Disorder, Hx Panic Disorder, Hx of Violent Episodes 

Against Others





- Surgical History


Surgery Procedure, Year, and Place:  & [facility] SURGERY X2.  CSP FUSION 

2013.  





- Immunization History


Date of Tetanus Vaccine: utd


Date of Influenza Vaccine: none


Infectious Disease History: No


Infectious Disease History: 


   Denies: Hx of Known/Suspected MRSA, Traveled Outside the [place] in Last 30 Days





- Family History


Known Family History: Positive: Other


   Negative: Diabetes


Family History: alcoholism, SI





- Social History


Alcohol Use: Daily


Alcohol Amount: 1.75 L vodka


Hx Substance Use: No


Substance Use Type: Reports: None


Substance Use Comment - Amount & Last Used: Pt denies marijuana use anymore d/t 

contract w/ pain clinic


Hx Tobacco Use: Yes


Smoking Status (MU): Heavy Every Day Tobacco Smoker


Type: Cigarettes


Amount Used/How Often: states she smokes 1 PPD


Have You Smoked in the Last Year: Yes





Review of Systems


Constitutional: Other - positive - alcohol consumption 


Positive: Fever - reported, on vitals, temp is 98.3 F 


Positive: Abdominal Pain.  Negative: Vomiting, Diarrhea, Nausea


Positive: Weakness - legs 


All Other Systems Reviewed And Are Negative: Yes





Physical Exam





- Summary


Physical Exam Summary: 


General: Well-developed, Well-nourished female. No acute distress. Patient 

smells of alcohol and cigarette smoke. 


HEENT: Normocephalic, Atraumatic. Patient has rhythmic movement of the head.


              Eyes: conjuctiva normal, PERRL.


              Ears: TMs within normal limits.


              Nares: (-) discharge, (-) erythema.


              Oropharynx: clear, mucous membranes moist, (-) exudates. 


Neck: soft, FROM, (-) lymphadenopathy, (-) thyromegaly, (-) JVD.


Cardiovascular: normal sinus rhythm, (-) murmur.


Respiratory: clear to auscultation bilaterally (-) wheezes, (-) rales, (-) 

rhonchi.


Abdomen: soft, non-tender, non-distended, (-) organomegaly, normal bowel sounds.


Neuro: Alert and oriented x3, no focal deficits.


Extremities: no edema.


Skin: Small erythematous papules distributed on her arms, legs, and back. There 

are none on her hands, no bugs seen. Warm, dry, (-) rash.


Psychiatric: mood normal, affect normal.








Triage Information Reviewed: Yes


Vital Signs On Initial Exam: 


 Initial Vitals











Temp Pulse Resp BP Pulse Ox


 


 98.3 F   106   18   140/72   94 


 


 19 18:18  19 18:18  19 18:18  19 18:18  19 18:18











Vital Signs Reviewed: Yes





Diagnostics





- Vital Signs


 Vital Signs











  Temp Pulse Resp BP Pulse Ox


 


 19 18:23   111    95


 


 19 18:20   102   140/72  95


 


 19 18:18  98.3 F  106  18  140/72  94














- Laboratory


Lab Results: 


 Lab Results











  19 Range/Units





  18:30 18:37 18:37 


 


WBC   5.6   (3.5-10.8)  10^3/uL


 


RBC   4.44   (3.70-4.87)  10^6 /uL


 


Hgb   14.5   (12.0-16.0)  g/dL


 


Hct   42   (35-47)  %


 


MCV   96   (80-97)  fL


 


MCH   33 H   (27-31)  pg


 


MCHC   34   (31-36)  g/dL


 


RDW   21 H   (10-15)  %


 


Plt Count   273   (150-450)  10^3/uL


 


MPV   6.9 L   (7.4-10.4)  fL


 


Neut % (Auto)   37.4   %


 


Lymph % (Auto)   52.1   %


 


Mono % (Auto)   9.8   %


 


Eos % (Auto)   0.2   %


 


Baso % (Auto)   0.5   %


 


Absolute Neuts (auto)   2.1   (1.5-7.7)  10^3/ul


 


Absolute Lymphs (auto)   2.9   (1.0-4.8)  10^3/ul


 


Absolute Monos (auto)   0.6   (0-0.8)  10^3/ul


 


Absolute Eos (auto)   0.0   (0-0.6)  10^3/ul


 


Absolute Basos (auto)   0.0   (0-0.2)  10^3/ul


 


Absolute Nucleated RBC   0.0   10^3/ul


 


Nucleated RBC %   0.1   


 


INR (Anticoag Therapy)    0.92  (0.82-1.09)  


 


Urine Color  Yellow    


 


Urine Appearance  Cloudy    


 


Urine pH  5.0    (5-9)  


 


Ur Specific Gravity  1.010    (1.010-1.030)  


 


Urine Protein  Negative    (Negative)  


 


Urine Ketones  1+ A    (Negative)  


 


Urine Blood  Negative    (Negative)  


 


Urine Nitrate  Negative    (Negative)  


 


Urine Bilirubin  Negative    (Negative)  


 


Urine Urobilinogen  Negative    (Negative)  


 


Ur Leukocyte Esterase  Negative    (Negative)  


 


Urine Glucose  Negative    (Negative)  











Result Diagrams: 


 19 18:37





 19 18:37


Lab Statement: Any lab studies that have been ordered have been reviewed, and 

results considered in the medical decision making process.





Re-Evaluation





- Re-Evaluation


  ** First Eval


Re-Evaluation Time: 20:30


Comment: Patient wants to go home. She states that she has someone who can come 

pick her up and give her a safe ride from ED.





  ** Second Eval


Re-Evaluation Time: 21:49


Change: Improved


Comment: Patient reports improvement in Sx after being given medications. 

Results were discussed with the patient as well as Sx that would warrant return 

to ED. Patient has a sober adult that will accompany her home.





Complex Multi-Symp Course/Dx


Course Of Treatment: Patient is a 44 y/o F presenting to ED with complaints of 

RUQ abdominal pain and bilateral leg weakness. Patient describes herself as an 

alcoholic. She states that she is unsure how much alcohol she consumed today 

but claims that she last drank in the morning. Patient notes that she has 

rashes to her arms, legs, and backside. Small erythematous papules distributed 

on her arms, legs, and back. There are none on her hands, no bugs seen. 

Bloodwork was obtained. Abnormal values include MCH 33, RDW 21, MPV 6.9, carbon 

dioxide 20, anion gap 18, , ALT 93, amylase 27, lipase 10. Beta hCG was 

negative. UA showed 1+ ketones. Serum alcohol was 371. Patient was given fluids 

and thiamine. Patient reports improvement in Sx after being given medications. 

Results were discussed with the patient as well as Sx that would warrant return 

to ED. Patient has a sober adult that will accompany her home.





- Diagnoses


Provider Diagnoses: 


 Acute alcohol intoxication, Abdominal pain








Discharge ED





- Sign-Out/Discharge


Documenting (check all that apply): Patient Departure - discharge


Patient Received Moderate/Deep Sedation with Procedure: No





- Discharge Plan


Condition: Stable


Disposition: HOME


Patient Education Materials:  Alcohol Intoxication (ED), Abdominal Pain (ED)


Referrals: 


Natan MIXON,Quinn BYRNE [Primary Care Provider] - 3 Days


Additional Instructions: 


Please follow up with your primary care physician within three days. Please 

return to ED for any new or worsening symptoms. 





- Billing Disposition and Condition


Condition: STABLE


Disposition: Home





- Attestation Statements


Document Initiated by Meñoibe: Yes


Documenting Scribe: SOL ORTEGA


Provider For Whom Jean Marie is Documenting (Include Credential): BLUE GOLD MD


Scribe Attestation: 


SOL WALSH, scribed for BLUE GOLD MD on 19 at 1117. 


Scribe Documentation Reviewed: Yes


Provider Attestation: 


The documentation as recorded by the SOL jolly accurately reflects 

the service I personally performed and the decisions made by me, BLUE GOLD MD


Status of Scribe Document: Viewed

## 2021-03-31 ENCOUNTER — HOSPITAL ENCOUNTER (EMERGENCY)
Dept: HOSPITAL 53 - M ED | Age: 46
Discharge: HOME | End: 2021-03-31
Payer: MEDICAID

## 2021-03-31 VITALS — DIASTOLIC BLOOD PRESSURE: 97 MMHG | SYSTOLIC BLOOD PRESSURE: 131 MMHG

## 2021-03-31 VITALS — HEIGHT: 65 IN | WEIGHT: 153.66 LBS | BODY MASS INDEX: 25.6 KG/M2

## 2021-03-31 DIAGNOSIS — F15.10: Primary | ICD-10-CM

## 2021-03-31 DIAGNOSIS — F17.200: ICD-10-CM

## 2021-03-31 DIAGNOSIS — F10.10: ICD-10-CM

## 2021-03-31 LAB
ALBUMIN SERPL BCG-MCNC: 3.5 GM/DL (ref 3.2–5.2)
ALT SERPL W P-5'-P-CCNC: 32 U/L (ref 12–78)
AMPHETAMINES UR QL SCN: POSITIVE
APAP SERPL-MCNC: < 2 UG/ML (ref 10–30)
BARBITURATES UR QL SCN: NEGATIVE
BENZODIAZ UR QL SCN: NEGATIVE
BILIRUB CONJ SERPL-MCNC: 0.1 MG/DL (ref 0–0.2)
BILIRUB SERPL-MCNC: 0.3 MG/DL (ref 0.2–1)
BUN SERPL-MCNC: 8 MG/DL (ref 7–18)
BZE UR QL SCN: NEGATIVE
CALCIUM SERPL-MCNC: 9.1 MG/DL (ref 8.5–10.1)
CANNABINOIDS UR QL SCN: NEGATIVE
CHLORIDE SERPL-SCNC: 106 MEQ/L (ref 98–107)
CO2 SERPL-SCNC: 27 MEQ/L (ref 21–32)
CREAT SERPL-MCNC: 0.46 MG/DL (ref 0.55–1.3)
ETHANOL SERPL-MCNC: 0.13 % (ref 0–0.01)
GFR SERPL CREATININE-BSD FRML MDRD: > 60 ML/MIN/{1.73_M2} (ref 58–?)
GLUCOSE SERPL-MCNC: 86 MG/DL (ref 70–100)
HCT VFR BLD AUTO: 46.4 % (ref 36–47)
HGB BLD-MCNC: 15.8 G/DL (ref 12–15.5)
MCH RBC QN AUTO: 35.4 PG (ref 27–33)
MCHC RBC AUTO-ENTMCNC: 34.1 G/DL (ref 32–36.5)
MCV RBC AUTO: 104 FL (ref 80–96)
METHADONE UR QL SCN: NEGATIVE
OPIATES UR QL SCN: NEGATIVE
PCP UR QL SCN: NEGATIVE
PLATELET # BLD AUTO: 309 10^3/UL (ref 150–450)
POTASSIUM SERPL-SCNC: 4.2 MEQ/L (ref 3.5–5.1)
PROT SERPL-MCNC: 7.5 GM/DL (ref 6.4–8.2)
RBC # BLD AUTO: 4.46 10^6/UL (ref 4–5.4)
SALICYLATES SERPL-MCNC: 3.4 MG/DL (ref 5–30)
SODIUM SERPL-SCNC: 141 MEQ/L (ref 136–145)
TSH SERPL DL<=0.005 MIU/L-ACNC: 2.06 UIU/ML (ref 0.36–3.74)
WBC # BLD AUTO: 4.7 10^3/UL (ref 4–10)